# Patient Record
Sex: FEMALE | Race: WHITE | Employment: FULL TIME
[De-identification: names, ages, dates, MRNs, and addresses within clinical notes are randomized per-mention and may not be internally consistent; named-entity substitution may affect disease eponyms.]

---

## 2017-06-10 ENCOUNTER — HEALTH MAINTENANCE LETTER (OUTPATIENT)
Age: 52
End: 2017-06-10

## 2017-09-01 ENCOUNTER — TRANSFERRED RECORDS (OUTPATIENT)
Dept: HEALTH INFORMATION MANAGEMENT | Facility: CLINIC | Age: 52
End: 2017-09-01

## 2017-10-23 ENCOUNTER — TELEPHONE (OUTPATIENT)
Dept: FAMILY MEDICINE | Facility: CLINIC | Age: 52
End: 2017-10-23

## 2017-10-23 ENCOUNTER — OFFICE VISIT (OUTPATIENT)
Dept: FAMILY MEDICINE | Facility: CLINIC | Age: 52
End: 2017-10-23
Payer: COMMERCIAL

## 2017-10-23 VITALS
WEIGHT: 154.8 LBS | HEART RATE: 60 BPM | DIASTOLIC BLOOD PRESSURE: 78 MMHG | TEMPERATURE: 98.5 F | BODY MASS INDEX: 24.3 KG/M2 | HEIGHT: 67 IN | SYSTOLIC BLOOD PRESSURE: 124 MMHG

## 2017-10-23 DIAGNOSIS — Z12.11 SPECIAL SCREENING FOR MALIGNANT NEOPLASMS, COLON: ICD-10-CM

## 2017-10-23 DIAGNOSIS — F98.8 ATTENTION DEFICIT DISORDER OF ADULT: ICD-10-CM

## 2017-10-23 DIAGNOSIS — Z13.6 CARDIOVASCULAR SCREENING; LDL GOAL LESS THAN 160: ICD-10-CM

## 2017-10-23 DIAGNOSIS — E55.9 VITAMIN D DEFICIENCY: ICD-10-CM

## 2017-10-23 DIAGNOSIS — R11.0 NAUSEA: ICD-10-CM

## 2017-10-23 DIAGNOSIS — R73.09 ELEVATED GLUCOSE: ICD-10-CM

## 2017-10-23 DIAGNOSIS — Z11.59 NEED FOR HEPATITIS C SCREENING TEST: ICD-10-CM

## 2017-10-23 DIAGNOSIS — R03.0 ELEVATED BLOOD PRESSURE READING WITHOUT DIAGNOSIS OF HYPERTENSION: ICD-10-CM

## 2017-10-23 DIAGNOSIS — Z01.419 ENCOUNTER FOR GYNECOLOGICAL EXAMINATION WITHOUT ABNORMAL FINDING: Primary | ICD-10-CM

## 2017-10-23 DIAGNOSIS — Z12.31 ENCOUNTER FOR SCREENING MAMMOGRAM FOR MALIGNANT NEOPLASM OF BREAST: ICD-10-CM

## 2017-10-23 LAB
ANION GAP SERPL CALCULATED.3IONS-SCNC: 5 MMOL/L (ref 3–14)
BUN SERPL-MCNC: 14 MG/DL (ref 7–30)
CALCIUM SERPL-MCNC: 9.5 MG/DL (ref 8.5–10.1)
CHLORIDE SERPL-SCNC: 105 MMOL/L (ref 94–109)
CHOLEST SERPL-MCNC: 263 MG/DL
CO2 SERPL-SCNC: 27 MMOL/L (ref 20–32)
CREAT SERPL-MCNC: 0.75 MG/DL (ref 0.52–1.04)
GFR SERPL CREATININE-BSD FRML MDRD: 80 ML/MIN/1.7M2
GLUCOSE SERPL-MCNC: 95 MG/DL (ref 70–99)
HBA1C MFR BLD: 5.4 % (ref 4.3–6)
HDLC SERPL-MCNC: 58 MG/DL
LDLC SERPL CALC-MCNC: 142 MG/DL
NONHDLC SERPL-MCNC: 205 MG/DL
POTASSIUM SERPL-SCNC: 4.1 MMOL/L (ref 3.4–5.3)
SODIUM SERPL-SCNC: 137 MMOL/L (ref 133–144)
TRIGL SERPL-MCNC: 316 MG/DL

## 2017-10-23 PROCEDURE — 82306 VITAMIN D 25 HYDROXY: CPT | Performed by: NURSE PRACTITIONER

## 2017-10-23 PROCEDURE — 36415 COLL VENOUS BLD VENIPUNCTURE: CPT | Performed by: NURSE PRACTITIONER

## 2017-10-23 PROCEDURE — 99396 PREV VISIT EST AGE 40-64: CPT | Performed by: NURSE PRACTITIONER

## 2017-10-23 PROCEDURE — 86803 HEPATITIS C AB TEST: CPT | Performed by: NURSE PRACTITIONER

## 2017-10-23 PROCEDURE — 80048 BASIC METABOLIC PNL TOTAL CA: CPT | Performed by: NURSE PRACTITIONER

## 2017-10-23 PROCEDURE — 87624 HPV HI-RISK TYP POOLED RSLT: CPT | Performed by: NURSE PRACTITIONER

## 2017-10-23 PROCEDURE — 80061 LIPID PANEL: CPT | Performed by: NURSE PRACTITIONER

## 2017-10-23 PROCEDURE — G0145 SCR C/V CYTO,THINLAYER,RESCR: HCPCS | Performed by: NURSE PRACTITIONER

## 2017-10-23 PROCEDURE — 83036 HEMOGLOBIN GLYCOSYLATED A1C: CPT | Performed by: NURSE PRACTITIONER

## 2017-10-23 RX ORDER — DEXTROAMPHETAMINE SACCHARATE, AMPHETAMINE ASPARTATE, DEXTROAMPHETAMINE SULFATE AND AMPHETAMINE SULFATE 5; 5; 5; 5 MG/1; MG/1; MG/1; MG/1
TABLET ORAL
Qty: 60 TABLET | Refills: 0 | Status: SHIPPED | OUTPATIENT
Start: 2017-11-22 | End: 2018-06-25

## 2017-10-23 RX ORDER — DEXTROAMPHETAMINE SACCHARATE, AMPHETAMINE ASPARTATE, DEXTROAMPHETAMINE SULFATE AND AMPHETAMINE SULFATE 5; 5; 5; 5 MG/1; MG/1; MG/1; MG/1
TABLET ORAL
Qty: 60 TABLET | Refills: 0 | Status: SHIPPED | OUTPATIENT
Start: 2017-10-23 | End: 2020-01-15

## 2017-10-23 RX ORDER — DEXTROAMPHETAMINE SACCHARATE, AMPHETAMINE ASPARTATE, DEXTROAMPHETAMINE SULFATE AND AMPHETAMINE SULFATE 5; 5; 5; 5 MG/1; MG/1; MG/1; MG/1
TABLET ORAL
Qty: 60 TABLET | Refills: 0 | Status: SHIPPED | OUTPATIENT
Start: 2017-12-22 | End: 2018-06-25

## 2017-10-23 RX ORDER — HYDROCODONE BITARTRATE AND ACETAMINOPHEN 5; 325 MG/1; MG/1
1 TABLET ORAL EVERY 6 HOURS PRN
COMMUNITY
End: 2018-06-25

## 2017-10-23 RX ORDER — ONDANSETRON 4 MG/1
4 TABLET, FILM COATED ORAL EVERY 8 HOURS PRN
Qty: 18 TABLET | Refills: 3 | Status: SHIPPED | OUTPATIENT
Start: 2017-10-23 | End: 2021-03-08

## 2017-10-23 ASSESSMENT — ANXIETY QUESTIONNAIRES
GAD7 TOTAL SCORE: 1
7. FEELING AFRAID AS IF SOMETHING AWFUL MIGHT HAPPEN: NOT AT ALL
6. BECOMING EASILY ANNOYED OR IRRITABLE: NOT AT ALL
2. NOT BEING ABLE TO STOP OR CONTROL WORRYING: NOT AT ALL
5. BEING SO RESTLESS THAT IT IS HARD TO SIT STILL: SEVERAL DAYS
1. FEELING NERVOUS, ANXIOUS, OR ON EDGE: NOT AT ALL
3. WORRYING TOO MUCH ABOUT DIFFERENT THINGS: NOT AT ALL

## 2017-10-23 ASSESSMENT — PATIENT HEALTH QUESTIONNAIRE - PHQ9
5. POOR APPETITE OR OVEREATING: NOT AT ALL
SUM OF ALL RESPONSES TO PHQ QUESTIONS 1-9: 2

## 2017-10-23 NOTE — PROGRESS NOTES
SUBJECTIVE:   CC: Kathy Davidson is an 52 year old woman who presents for preventive health visit.     Healthy Habits:    Do you get at least three servings of calcium containing foods daily (dairy, green leafy vegetables, etc.)? yes    Amount of exercise or daily activities, outside of work: Has not been exercising much due to getting rid of dog and shoulder pain.    Problems taking medications regularly No    Medication side effects: No    Have you had an eye exam in the past two years? yes    Do you see a dentist twice per year? yes  Do you have sleep apnea, excessive snoring or daytime drowsiness?no    *Usually goes to [facility]. Needs refills on zofran and adderall.    *Is fasting.    *Does have a partially torn rotator cuff on the left shoulder, would like to discuss what to do about it.     *Hm - Declines flu shot. PHQ9 given to patient, completed see assessments. Colonoscopy completed 12/2014 and mammogram 9/2017 at [facility].                                                                                                       Some-                                                                        Never   Rarely      times       Often  Very Often  1. How often do you have trouble wrapping up the final details of a project,  once the challenging parts have been done?     x   2. How often do you have difficulty getting things in order when you have to do a task that requires organization?    x    3. How often do you have problems remembering appointments or obligations?    x    4. When you have a task that requires a lot of thought, how often do you avoid or delay getting started?    x    5. How often do you fidget or squirm with your hands or feet when you have to sit down for a long time?    x    6. How often do you feel overly active and compelled to do things, like you were driven by a motor?   x       Part A                                                        7. How often do  you make careless mistakes when you have to work on a boring or difficult project?     x   8. How often do you have difficulty keeping your attention when you are doing boring or repetitive work?     x   9. How often do you have difficulty concentrating on what people say to you, even when they are speaking to you directly?    x    10. How often do you misplace or have difficulty finding things at home or at work?     x   11. How often are you distracted by activity or noise around you?   x     12. How often do you leave your seat in meetings or other situations in which you are expected to remain seated?      x    13. How often do you feel restless or fidgety?      x    14. How often do you have difficulty unwinding and relaxing when you have time to yourself?   x     15. How often do you find yourself talking too much when you are in social situations?    x    16. When you're in a conversation, how often do you find yourself finishing the sentences of the people you are talking to, before they can finish them themselves?    x    17. How often do you have difficulty waiting your turn in situations when turn-taking is required?   x     18. How often do you interrupt others when they are busy?  x          Today's PHQ-2 Score:   PHQ-2 ( 1999 Pfizer) 2/29/2016 5/2/2014   Q1: Little interest or pleasure in doing things 0 0   Q2: Feeling down, depressed or hopeless 0 0   PHQ-2 Score 0 0     Abuse: Current or Past(Physical, Sexual or Emotional)- No  Do you feel safe in your environment - Yes  Social History   Substance Use Topics     Smoking status: Never Smoker     Smokeless tobacco: Never Used     Alcohol use Yes      Comment: weekends, never during the week     The patient does not drink >3 drinks per day nor >7 drinks per week.    Reviewed orders with patient.  Reviewed health maintenance and updated orders accordingly - Yes  Labs reviewed in [facility]    Patient over age 50, mutual decision to screen reflected in health  "maintenance.      Pertinent mammograms are reviewed under the imaging tab.  History of abnormal Pap smear: NO - age 30- 65 PAP every 3 years recommended    Reviewed and updated as needed this visit by clinical staff  Tobacco  Allergies  Meds  Med Hx  Surg Hx  Fam Hx  Soc Hx        Reviewed and updated as needed this visit by Provider  Tobacco  Allergies  Meds  Med Hx  Surg Hx  Fam Hx  Soc Hx           ROS:  C: NEGATIVE for fever, chills, change in weight  I: NEGATIVE for worrisome rashes, moles or lesions  E: NEGATIVE for vision changes or irritation, has contacts,  Current with eye exam   ENT: NEGATIVE for ear, mouth and throat problems, POSITIVE for sinus.  Deviation, have been bad this fall.  See ENT as needed   R: NEGATIVE for significant cough or SOB  B: NEGATIVE for masses, tenderness or discharge,  Does get mammogram in [place]   CV: NEGATIVE for chest pain, palpitations or peripheral edema, , hx of ablation has done well since then   GI: NEGATIVE for nausea, abdominal pain, heartburn, or change in bowel habits  : NEGATIVE for unusual urinary or vaginal symptoms. No vaginal bleeding.  MUSCULOSKELETAL:POSITIVE  for joint pain left rotator cuff , no other issues   N: NEGATIVE for weakness, dizziness or paresthesias  E: NEGATIVE for temperature intolerance, skin/hair changes  H: NEGATIVE for bleeding problems  PSYCHIATRIC: NEGATIVE for changes in mood or affect and POSITIVE foranxiety when her son plays BB.  He did have a full workup for headaches to find out he has POTS and marfan syndrome. She is doing well with the diagnosis.      OBJECTIVE:   /78 (BP Location: Left arm, Patient Position: Chair, Cuff Size: Adult Regular)  Pulse 60  Temp 98.5  F (36.9  C) (Tympanic)  Ht 5' 6.58\" (1.691 m)  Wt 154 lb 12.8 oz (70.2 kg)  BMI 24.56 kg/m2  EXAM:  GENERAL APPEARANCE: healthy, alert and no distress  EYES: Eyes grossly normal to inspection, PERRL and conjunctivae and sclerae normal  HENT: " ear canals and TM's normal, oropharynx clear, oral mucous membranes moist, nasal mucosa edematous  and sinuses: maxillary, frontal tenderness on bilateral, maxillary, frontal swelling on bilateral  NECK: no adenopathy, no asymmetry, masses, or scars and thyroid normal to palpation  RESP: lungs clear to auscultation - no rales, rhonchi or wheezes  BREAST: normal without masses, tenderness or nipple discharge and no palpable axillary masses or adenopathy  CV: regular rate and rhythm, normal S1 S2, no S3 or S4, no murmur, click or rub, no peripheral edema and peripheral pulses strong  ABDOMEN: soft, nontender, no hepatosplenomegaly, no masses and bowel sounds normal   (female): normal female external genitalia, normal urethral meatus, vaginal mucosal atrophy noted and normal cervix, adnexae, and uterus without masses.  MS: no musculoskeletal defects are noted, gait is age appropriate without ataxia, left shoulder does have limited ROM..  With abduction  90 degrees ,  Flexion  To  100 degrees   SKIN: no suspicious lesions or rashes  NEURO: Normal strength and tone, sensory exam grossly normal, mentation intact and speech normal  PSYCH: mentation appears normal and affect normal/bright    ASSESSMENT/PLAN:     ASSESSMENT/PLAN:      ICD-10-CM    1. Encounter for gynecological examination without abnormal finding Z01.419 Pap imaged thin layer screen with HPV - recommended age 30 - 65     HPV High Risk Types DNA Cervical   2. Special screening for malignant neoplasms, colon Z12.11 GASTROENTEROLOGY ADULT REF PROCEDURE ONLY   3. Encounter for screening mammogram for malignant neoplasm of breast Z12.31 MA Screening Digital Bilateral   4. CARDIOVASCULAR SCREENING; LDL GOAL LESS THAN 160 Z13.6 Lipid panel reflex to direct LDL   5. Elevated blood pressure reading without diagnosis of hypertension R03.0 Basic metabolic panel  (Ca, Cl, CO2, Creat, Gluc, K, Na, BUN)   6. Elevated glucose R73.09 Hemoglobin A1c   7. Need for  hepatitis C screening test Z11.59 Hepatitis C Screen Reflex to HCV RNA Quant and Genotype   8. Attention deficit disorder of adult F98.8 amphetamine-dextroamphetamine (ADDERALL) 20 MG per tablet     amphetamine-dextroamphetamine (ADDERALL) 20 MG per tablet     amphetamine-dextroamphetamine (ADDERALL) 20 MG per tablet   9. Nausea R11.0 ondansetron (ZOFRAN) 4 MG tablet       Patient Instructions     Preventive Health Recommendations  Female Ages 50 - 64    Yearly exam: See your health care provider every year in order to  o Review health changes.   o Discuss preventive care.    o Review your medicines if your doctor has prescribed any.      Get a Pap test every three years (unless you have an abnormal result and your provider advises testing more often).    If you get Pap tests with HPV test, you only need to test every 5 years, unless you have an abnormal result.     You do not need a Pap test if your uterus was removed (hysterectomy) and you have not had cancer.    You should be tested each year for STDs (sexually transmitted diseases) if you're at risk.     Have a mammogram every 1 to 2 years.    Have a colonoscopy at age 50, or have a yearly FIT test (stool test). These exams screen for colon cancer.      Have a cholesterol test every 5 years, or more often if advised.    Have a diabetes test (fasting glucose) every three years. If you are at risk for diabetes, you should have this test more often.     If you are at risk for osteoporosis (brittle bone disease), think about having a bone density scan (DEXA).    Shots: Get a flu shot each year. Get a tetanus shot every 10 years.    Nutrition:     Eat at least 5 servings of fruits and vegetables each day.    Eat whole-grain bread, whole-wheat pasta and brown rice instead of white grains and rice.    Talk to your provider about Calcium and Vitamin D.     Lifestyle    Exercise at least 150 minutes a week (30 minutes a day, 5 days a week). This will help you control  "your weight and prevent disease.    Limit alcohol to one drink per day.    No smoking.     Wear sunscreen to prevent skin cancer.     See your dentist every six months for an exam and cleaning.    See your eye doctor every 1 to 2 years.      Please call and have them send your mammo and MRI results.     Keep exercising the left shoulder , swimming ,    See physical therapy .     You are doing well with every thing that has been going on     For the sinuses ,   Warm facial soaks and massage,    Use the nasal irrigation   Consider seeing  ENT                 COUNSELING:   Reviewed preventive health counseling, as reflected in patient instructions       Regular exercise       Healthy diet/nutrition       Vision screening       Advance Care Planning         reports that she has never smoked. She has never used smokeless tobacco.    Estimated body mass index is 24.56 kg/(m^2) as calculated from the following:    Height as of this encounter: 5' 6.58\" (1.691 m).    Weight as of this encounter: 154 lb 12.8 oz (70.2 kg).         Counseling Resources:  ATP IV Guidelines  Pooled Cohorts Equation Calculator  Breast Cancer Risk Calculator  FRAX Risk Assessment  ICSI Preventive Guidelines  Dietary Guidelines for Americans, 2010  USDA's MyPlate  ASA Prophylaxis  Lung CA Screening    JONATHAN GARCIA NP, APRN CNP  [facility]  "

## 2017-10-23 NOTE — NURSING NOTE
"Chief Complaint   Patient presents with     Physical       Initial /78 (BP Location: Left arm, Patient Position: Chair, Cuff Size: Adult Regular)  Pulse 60  Temp 98.5  F (36.9  C) (Tympanic)  Ht 5' 6.58\" (1.691 m)  Wt 154 lb 12.8 oz (70.2 kg)  BMI 24.56 kg/m2 Estimated body mass index is 24.56 kg/(m^2) as calculated from the following:    Height as of this encounter: 5' 6.58\" (1.691 m).    Weight as of this encounter: 154 lb 12.8 oz (70.2 kg).  Medication Reconciliation: complete     Viviana Lind CMA (AAMA)      "

## 2017-10-23 NOTE — TELEPHONE ENCOUNTER
Please abstract the following data from this visit with this patient into the appropriate field in Epic:    Colonoscopy done on this date: 12/1/2014 (approximately), by this group: [facility], results were normal, repeat in 5 years per family history.   Mammogram done on this date: 9/1/17 (approximately), by this group: [facility], results were normal.    Viviana Lind CMA ([facility])

## 2017-10-23 NOTE — MR AVS SNAPSHOT
After Visit Summary   10/23/2017    Kathy Davidson    MRN: [id number]           Patient Information     Date Of Birth          1965        Visit Information        Provider Department      10/23/2017 10:00 AM Chiquita Hunter APRN [facility]        Today's Diagnoses     Encounter for gynecological examination without abnormal finding    -  1    Special screening for malignant neoplasms, colon        Encounter for screening mammogram for malignant neoplasm of breast        CARDIOVASCULAR SCREENING; LDL GOAL LESS THAN 160        Elevated blood pressure reading without diagnosis of hypertension        Elevated glucose        Need for hepatitis C screening test        Attention deficit disorder of adult        Nausea          Care Instructions      Preventive Health Recommendations  Female Ages 50 - 64    Yearly exam: See your health care provider every year in order to  o Review health changes.   o Discuss preventive care.    o Review your medicines if your doctor has prescribed any.      Get a Pap test every three years (unless you have an abnormal result and your provider advises testing more often).    If you get Pap tests with HPV test, you only need to test every 5 years, unless you have an abnormal result.     You do not need a Pap test if your uterus was removed (hysterectomy) and you have not had cancer.    You should be tested each year for STDs (sexually transmitted diseases) if you're at risk.     Have a mammogram every 1 to 2 years.    Have a colonoscopy at age 50, or have a yearly FIT test (stool test). These exams screen for colon cancer.      Have a cholesterol test every 5 years, or more often if advised.    Have a diabetes test (fasting glucose) every three years. If you are at risk for diabetes, you should have this test more often.     If you are at risk for osteoporosis (brittle bone disease), think about having a bone density scan (DEXA).    Shots: Get a  flu shot each year. Get a tetanus shot every 10 years.    Nutrition:     Eat at least 5 servings of fruits and vegetables each day.    Eat whole-grain bread, whole-wheat pasta and brown rice instead of white grains and rice.    Talk to your provider about Calcium and Vitamin D.     Lifestyle    Exercise at least 150 minutes a week (30 minutes a day, 5 days a week). This will help you control your weight and prevent disease.    Limit alcohol to one drink per day.    No smoking.     Wear sunscreen to prevent skin cancer.     See your dentist every six months for an exam and cleaning.    See your eye doctor every 1 to 2 years.      Please call and have them send your mammo and MRI results.     Keep exercising the left shoulder , swimming ,    See physical therapy .     You are doing well with every thing that has been going on     For the sinuses ,   Warm facial soaks and massage,    Use the nasal irrigation   Consider seeing  ENT               Follow-ups after your visit        Additional Services     GASTROENTEROLOGY ADULT REF PROCEDURE ONLY       Last Lab Result: Creatinine (mg/dL)       Date                     Value                 05/02/2014               0.94             ----------  There is no height or weight on file to calculate BMI.     Needed:  No  Language:  English    Patient will be contacted to schedule procedure.     Please be aware that coverage of these services is subject to the terms and limitations of your health insurance plan.  Call member services at your health plan with any benefit or coverage questions.  Any procedures must be performed at a [place] facility OR coordinated by your clinic's referral office.    Please bring the following with you to your appointment:    (1) Any X-Rays, CTs or MRIs which have been performed.  Contact the facility where they were done to arrange for  prior to your scheduled appointment.    (2) List of current medications   (3) This referral  "request   (4) Any documents/labs given to you for this referral                  Future tests that were ordered for you today     Open Future Orders        Priority Expected Expires Ordered    MA Screening Digital Bilateral Routine  10/24/2018 10/23/2017            Who to contact     Normal or non-critical lab and imaging results will be communicated to you by BetterPethart, letter or phone within 4 business days after the clinic has received the results. If you do not hear from us within 7 days, please contact the clinic through BetterPethart or phone. If you have a critical or abnormal lab result, we will notify you by phone as soon as possible.  Submit refill requests through Power Content or call your pharmacy and they will forward the refill request to us. Please allow 3 business days for your refill to be completed.          If you need to speak with a  for additional information , please call: [phone]           Additional Information About Your Visit        Power Content Information     Power Content gives you secure access to your electronic health record. If you see a primary care provider, you can also send messages to your care team and make appointments. If you have questions, please call your primary care clinic.  If you do not have a primary care provider, please call [phone] and they will assist you.        Care EveryWhere ID     This is your Care EveryWhere ID. This could be used by other organizations to access your [place] medical records  [id number]        Your Vitals Were     Pulse Temperature Height BMI (Body Mass Index)          60 98.5  F (36.9  C) (Tympanic) 5' 6.58\" (1.691 m) 24.56 kg/m2         Blood Pressure from Last 3 Encounters:   10/23/17 124/78   02/29/16 126/72   11/05/14 128/72    Weight from Last 3 Encounters:   10/23/17 154 lb 12.8 oz (70.2 kg)   02/29/16 155 lb 9.6 oz (70.6 kg)   11/05/14 140 lb 2 oz (63.6 kg)              We Performed the Following     Basic metabolic panel  " (Ca, Cl, CO2, Creat, Gluc, K, Na, BUN)     GASTROENTEROLOGY ADULT REF PROCEDURE ONLY     Hemoglobin A1c     Hepatitis C Screen Reflex to HCV RNA Quant and Genotype     HPV High Risk Types DNA Cervical     Lipid panel reflex to direct LDL     Pap imaged thin layer screen with HPV - recommended age 30 - 65          Today's Medication Changes          These changes are accurate as of: 10/23/17 10:52 AM.  If you have any questions, ask your nurse or doctor.               Start taking these medicines.        Dose/Directions    * amphetamine-dextroamphetamine 20 MG per tablet   Commonly known as:  ADDERALL   Used for:  Attention deficit disorder of adult   Started by:  Chiquita Hunter APRN CNP        Take 1 tablet by mouth 2 times daily for 30 days.   Quantity:  60 tablet   Refills:  0       * amphetamine-dextroamphetamine 20 MG per tablet   Commonly known as:  ADDERALL   Used for:  Attention deficit disorder of adult   Started by:  Chiquita Hunter APRN CNP        Start taking on:  11/22/2017   Take 1 tablet by mouth 2 times daily for 30 days.   Quantity:  60 tablet   Refills:  0       * amphetamine-dextroamphetamine 20 MG per tablet   Commonly known as:  ADDERALL   Used for:  Attention deficit disorder of adult   Started by:  Chiquita Hunter APRN CNP        Start taking on:  12/22/2017   Take 1 tablet by mouth 2 times daily for 30 days.   Quantity:  60 tablet   Refills:  0       * Notice:  This list has 3 medication(s) that are the same as other medications prescribed for you. Read the directions carefully, and ask your doctor or other care provider to review them with you.         Where to get your medicines      These medications were sent to [id number] - [place] - [address]  [address] [place] [address]     Phone:  [phone]     ondansetron 4 MG tablet         Some of these will need a paper prescription and others can be bought over the counter.  Ask your nurse if  you have questions.     Bring a paper prescription for each of these medications     amphetamine-dextroamphetamine 20 MG per tablet    amphetamine-dextroamphetamine 20 MG per tablet    amphetamine-dextroamphetamine 20 MG per tablet                Primary Care Provider Office Phone # Fax #    Chiquita SilvanaABIGAIL Jones [facility] [phone] [address] [place] [address]        Equal Access to Services     [facility] Center[facility] : Hadii aad ku hadasho Soomaali, waaxda luqadaha, qaybta kaalmada adeegyada, waxay idiin hayaan adeeg kharash la'aan ah. So [facility] [phone].    ATENCIÓN: Si habla español, tiene a tavarez disposición servicios gratuitos de asistencia lingüística. Llame al [phone].    We comply with applicable federal civil rights laws and [place] laws. We do not discriminate on the basis of race, color, national origin, age, disability, sex, sexual orientation, or gender identity.            Thank you!     [facility] you for [facility]  for your care. Our goal is always to provide you with excellent care. Hearing back from our patients is one way we can continue to improve our services. Please take a few minutes to complete the written survey that you may receive in the mail after your visit with us. Thank you!             Your Updated Medication List - Protect others around you: Learn how to safely use, store and throw away your medicines at www.disposemymeds.org.          This list is accurate as of: 10/23/17 10:52 AM.  Always use your most recent med list.                   Brand Name Dispense Instructions for use Diagnosis    * amphetamine-dextroamphetamine 20 MG per tablet    ADDERALL    60 tablet    Take 1 tablet by mouth 2 times daily for 30 days.    Attention deficit disorder of adult       * amphetamine-dextroamphetamine 20 MG per tablet   Start taking on:  11/22/2017    ADDERALL    60 tablet    Take 1 tablet by mouth 2 times daily for 30 days.    Attention deficit  disorder of adult       * amphetamine-dextroamphetamine 20 MG per tablet   Start taking on:  12/22/2017    ADDERALL    60 tablet    Take 1 tablet by mouth 2 times daily for 30 days.    Attention deficit disorder of adult       aspirin 81 MG tablet      Take  by mouth daily.        Azelaic Acid 15 % gel    FINACEA    50 g    Apply 0.5 inches topically as needed    Acne vulgaris       CYCLOBENZAPRINE HCL PO      Take 10 mg by mouth as needed for muscle spasms        DAILY MULTIVITAMIN PO      Take  by mouth daily.        fish oil-omega-3 fatty acids 1000 MG capsule      Take 2 g by mouth 2 times daily.        HYDROcodone-acetaminophen 5-325 MG per tablet    NORCO     Take 1 tablet by mouth every 6 hours as needed for moderate to severe pain        ondansetron 4 MG tablet    ZOFRAN    18 tablet    Take 1 tablet (4 mg) by mouth every 8 hours as needed for nausea or vomiting    Nausea       spironolactone 50 MG tablet    ALDACTONE    180 tablet    Take 1 tablet (50 mg) by mouth 2 times daily    Acne vulgaris       VITAMIN C PO      Take 1,000 mg by mouth daily        VITAMIN D (CHOLECALCIFEROL) PO      Take 1,000 Units by mouth daily        * Notice:  This list has 3 medication(s) that are the same as other medications prescribed for you. Read the directions carefully, and ask your doctor or other care provider to review them with you.

## 2017-10-23 NOTE — PATIENT INSTRUCTIONS
Preventive Health Recommendations  Female Ages 50 - 64    Yearly exam: See your health care provider every year in order to  o Review health changes.   o Discuss preventive care.    o Review your medicines if your doctor has prescribed any.      Get a Pap test every three years (unless you have an abnormal result and your provider advises testing more often).    If you get Pap tests with HPV test, you only need to test every 5 years, unless you have an abnormal result.     You do not need a Pap test if your uterus was removed (hysterectomy) and you have not had cancer.    You should be tested each year for STDs (sexually transmitted diseases) if you're at risk.     Have a mammogram every 1 to 2 years.    Have a colonoscopy at age 50, or have a yearly FIT test (stool test). These exams screen for colon cancer.      Have a cholesterol test every 5 years, or more often if advised.    Have a diabetes test (fasting glucose) every three years. If you are at risk for diabetes, you should have this test more often.     If you are at risk for osteoporosis (brittle bone disease), think about having a bone density scan (DEXA).    Shots: Get a flu shot each year. Get a tetanus shot every 10 years.    Nutrition:     Eat at least 5 servings of fruits and vegetables each day.    Eat whole-grain bread, whole-wheat pasta and brown rice instead of white grains and rice.    Talk to your provider about Calcium and Vitamin D.     Lifestyle    Exercise at least 150 minutes a week (30 minutes a day, 5 days a week). This will help you control your weight and prevent disease.    Limit alcohol to one drink per day.    No smoking.     Wear sunscreen to prevent skin cancer.     See your dentist every six months for an exam and cleaning.    See your eye doctor every 1 to 2 years.      Please call and have them send your mammo and MRI results.     Keep exercising the left shoulder , swimming ,    See physical therapy .     You are doing well  with every thing that has been going on     For the sinuses ,   Warm facial soaks and massage,    Use the nasal irrigation   Consider seeing  ENT

## 2017-10-24 LAB
DEPRECATED CALCIDIOL+CALCIFEROL SERPL-MC: 28 UG/L (ref 20–75)
HCV AB SERPL QL IA: NONREACTIVE

## 2017-10-24 ASSESSMENT — ANXIETY QUESTIONNAIRES: GAD7 TOTAL SCORE: 1

## 2017-10-25 LAB
COPATH REPORT: NORMAL
PAP: NORMAL

## 2017-10-26 LAB
FINAL DIAGNOSIS: NORMAL
HPV HR 12 DNA CVX QL NAA+PROBE: NEGATIVE
HPV16 DNA SPEC QL NAA+PROBE: NEGATIVE
HPV18 DNA SPEC QL NAA+PROBE: NEGATIVE
SPECIMEN DESCRIPTION: NORMAL

## 2018-06-25 ENCOUNTER — HOSPITAL ENCOUNTER (EMERGENCY)
Facility: CLINIC | Age: 53
Discharge: HOME | End: 2018-06-25
Attending: EMERGENCY MEDICINE | Admitting: EMERGENCY MEDICINE
Payer: COMMERCIAL

## 2018-06-25 ENCOUNTER — APPOINTMENT (OUTPATIENT)
Dept: GENERAL RADIOLOGY | Facility: CLINIC | Age: 53
End: 2018-06-25
Attending: EMERGENCY MEDICINE
Payer: COMMERCIAL

## 2018-06-25 VITALS
DIASTOLIC BLOOD PRESSURE: 92 MMHG | OXYGEN SATURATION: 100 % | WEIGHT: 150 LBS | HEART RATE: 104 BPM | SYSTOLIC BLOOD PRESSURE: 124 MMHG | RESPIRATION RATE: 18 BRPM | BODY MASS INDEX: 23.79 KG/M2

## 2018-06-25 DIAGNOSIS — S20.229A CONTUSION OF BACK, UNSPECIFIED LATERALITY, INITIAL ENCOUNTER: ICD-10-CM

## 2018-06-25 PROCEDURE — 25000128 H RX IP 250 OP 636: Performed by: EMERGENCY MEDICINE

## 2018-06-25 PROCEDURE — 72100 X-RAY EXAM L-S SPINE 2/3 VWS: CPT | Mod: TC

## 2018-06-25 PROCEDURE — 99285 EMERGENCY DEPT VISIT HI MDM: CPT | Performed by: EMERGENCY MEDICINE

## 2018-06-25 PROCEDURE — 96372 THER/PROPH/DIAG INJ SC/IM: CPT | Performed by: EMERGENCY MEDICINE

## 2018-06-25 PROCEDURE — 99285 EMERGENCY DEPT VISIT HI MDM: CPT | Mod: Z6 | Performed by: EMERGENCY MEDICINE

## 2018-06-25 RX ORDER — DIAZEPAM 5 MG
5-10 TABLET ORAL EVERY 6 HOURS PRN
Qty: 20 TABLET | Refills: 0 | Status: SHIPPED | OUTPATIENT
Start: 2018-06-25 | End: 2018-07-02

## 2018-06-25 RX ORDER — HYDROCODONE BITARTRATE AND ACETAMINOPHEN 5; 325 MG/1; MG/1
1-2 TABLET ORAL EVERY 4 HOURS PRN
Qty: 18 TABLET | Refills: 0 | Status: SHIPPED | OUTPATIENT
Start: 2018-06-25 | End: 2020-01-15

## 2018-06-25 RX ADMIN — HYDROMORPHONE HYDROCHLORIDE 1 MG: 1 INJECTION, SOLUTION INTRAMUSCULAR; INTRAVENOUS; SUBCUTANEOUS at 09:42

## 2018-06-25 NOTE — ED TRIAGE NOTES
Patient here with back pain after a fall out of the hot tub on Saturday. Patient has a bad back to begin with and she has been taking her medications at home but cannot walk on her own.

## 2018-06-25 NOTE — ED AVS SNAPSHOT
[facility] Emergency Department    [address] [place] [address]    Phone:  [phone]    Fax:  [phone]                                       Kathy Davidson   MRN: [id number]    Department:  [facility] Emergency Department   Date of Visit:  6/25/2018           Patient Information     Date Of Birth          1965        Your diagnoses for this visit were:     Contusion of back, unspecified laterality, initial encounter        You were seen by Ramírez Randle MD.      Follow-up Information     Follow up with Adamaris Read MD.    Specialty:  Family Medicine - Sports Medicine    Why:  As needed    Contact information:    [address]  [place] [id number] [phone]        Discharge References/Attachments     BACK CONTUSION (ENGLISH)      24 Hour Appointment Hotline       To make an appointment at any [facility] clinic, call [phone] ([phone]). If you don't have a family doctor or clinic, we will help you find one. [facility] clinics are conveniently located to serve the needs of you and your family.             Review of your medicines      START taking        Dose / Directions Last dose taken    diazepam 5 MG tablet   Commonly known as:  VALIUM   Dose:  5-10 mg   Quantity:  20 tablet        Take 1-2 tablets (5-10 mg) by mouth every 6 hours as needed for muscle spasms (MUSCLE SPASM)   Refills:  0          CONTINUE these medicines which may have CHANGED, or have new prescriptions. If we are uncertain of the size of tablets/capsules you have at home, strength may be listed as something that might have changed.        Dose / Directions Last dose taken    HYDROcodone-acetaminophen 5-325 MG per tablet   Commonly known as:  NORCO   Dose:  1-2 tablet   What changed:    - how much to take  - when to take this  - reasons to take this   Quantity:  18 tablet        Take 1-2 tablets by mouth every 4 hours as needed for pain   Refills:  0          Our records  show that you are taking the medicines listed below. If these are incorrect, please call your family doctor or clinic.        Dose / Directions Last dose taken    amphetamine-dextroamphetamine 20 MG per tablet   Commonly known as:  ADDERALL   Quantity:  60 tablet        Take 1 tablet by mouth 2 times daily for 30 days.   Refills:  0        aspirin 81 MG tablet        Take  by mouth daily.   Refills:  0        Azelaic Acid 15 % gel   Commonly known as:  FINACEA   Dose:  0.5 inch   Quantity:  50 g        Apply 0.5 inches topically as needed   Refills:  3        CYCLOBENZAPRINE HCL PO   Dose:  10 mg        Take 10 mg by mouth as needed for muscle spasms   Refills:  0        DAILY MULTIVITAMIN PO        Take  by mouth daily.   Refills:  0        fish oil-omega-3 fatty acids 1000 MG capsule   Dose:  2 g        Take 2 g by mouth 2 times daily.   Refills:  0        ondansetron 4 MG tablet   Commonly known as:  ZOFRAN   Dose:  4 mg   Quantity:  18 tablet        Take 1 tablet (4 mg) by mouth every 8 hours as needed for nausea or vomiting   Refills:  3        spironolactone 50 MG tablet   Commonly known as:  ALDACTONE   Dose:  50 mg   Quantity:  180 tablet        Take 1 tablet (50 mg) by mouth 2 times daily   Refills:  3        VITAMIN C PO   Dose:  1000 mg        Take 1,000 mg by mouth daily   Refills:  0        VITAMIN D (CHOLECALCIFEROL) PO   Dose:  1000 Units        Take 1,000 Units by mouth daily   Refills:  0                Information about OPIOIDS     PRESCRIPTION OPIOIDS: WHAT YOU NEED TO KNOW   We gave you an opioid (narcotic) pain medicine. It is important to manage your pain, but opioids are not always the best choice. You should first try all the other options your care team gave you. Take this medicine for as short a time (and as few doses) as possible.     These medicines have risks:    DO NOT drive when on new or higher doses of pain medicine. These medicines can affect your alertness and reaction times, and  you could be arrested for driving under the influence (DUI). If you need to use opioids long-term, talk to your care team about driving.    DO NOT operate heave machinery    DO NOT do any other dangerous activities while taking these medicines.     DO NOT drink any alcohol while taking these medicines.      If the opioid prescribed includes acetaminophen, DO NOT take with any other medicines that contain acetaminophen. Read all labels carefully. Look for the word  acetaminophen  or  Tylenol.  Ask your pharmacist if you have questions or are unsure.    You can get addicted to pain medicines, especially if you have a history of addiction (chemical, alcohol or substance dependence). Talk to your care team about ways to reduce this risk.    Store your pills in a secure place, locked if possible. We will not replace any lost or stolen medicine. If you don t finish your medicine, please throw away (dispose) as directed by your pharmacist. The [place] [employer]ution [employer] has more information about safe disposal: https://www.pca.formerly [facility].[place]/living-green/managing-unwanted-medications.     All opioids tend to cause constipation. Drink plenty of water and eat foods that have a lot of fiber, such as fruits, vegetables, prune juice, apple juice and high-fiber cereal. Take a laxative (Miralax, milk of magnesia, Colace, Senna) if you don t move your bowels at least every other day.         Prescriptions were sent or printed at these locations (2 Prescriptions)                   [facility] [place], [place] - [address]   [address] [place] [address] [place] [facility] [address]    Telephone:  [phone]   Fax:  [phone]   Hours:                  Printed at Department/Unit printer (2 of 2)         diazepam (VALIUM) 5 MG tablet               HYDROcodone-acetaminophen (NORCO) 5-325 MG per tablet                Procedures and tests performed during your visit     Lumbar spine XR, 2-3 views      Orders Needing Specimen  Collection     None      Pending Results     Date and Time Order Name Status Description    6/25/2018 0939 Lumbar spine XR, 2-3 views Preliminary             Pending Culture Results     No orders found from 6/23/2018 to 6/26/2018.            Pending Results Instructions     If you had any lab results that were not finalized at the time of your Discharge, you can call the ED Lab Result RN at [phone]. You will be contacted by this team for any positive Lab results or changes in treatment. The nurses are available 7 days a week from 10A to 6:30P.  You can leave a message 24 hours per day and they will return your call.        Thank you for choosing [facility]       Thank you for choosing [facility] for your care. Our goal is always to provide you with excellent care. Hearing back from our patients is one way we can continue to improve our services. Please take a few minutes to complete the written survey that you may receive in the mail after you visit with us. Thank you!        [employer] Information     [employer] gives you secure access to your electronic health record. If you see a primary care provider, you can also send messages to your care team and make appointments. If you have questions, please call your primary care clinic.  If you do not have a primary care provider, please call [phone] and they will assist you.        Care EveryWhere ID     This is your Care EveryWhere ID. This could be used by other organizations to access your [facility] medical records  [id number]        Equal Access to Services     GURPREET GASTELUM AH: Hadii selin Quintana, waclement restrepo, qaybiglesia lewisalmayra matamoros. So [facility] [phone].    ATENCIÓN: Si habla español, tiene a tavarez disposición servicios gratuitos de asistencia lingüística. Llame al [phone].    We comply with applicable federal civil rights laws and [place] laws. We do not discriminate on the basis of race, color,  national origin, age, disability, sex, sexual orientation, or gender identity.            After Visit Summary       This is your record. Keep this with you and show to your community pharmacist(s) and doctor(s) at your next visit.

## 2018-06-25 NOTE — ED AVS SNAPSHOT
[facility] Emergency Department    [address] [place] [address]    Phone:  [phone]    Fax:  [phone]                                       Kathy Davidson   MRN: [id number]    Department:  [facility] Emergency Department   Date of Visit:  6/25/2018           After Visit Summary Signature Page     I have received my discharge instructions, and my questions have been answered. I have discussed any challenges I see with this plan with the nurse or doctor.    ..........................................................................................................................................  Patient/Patient Representative Signature      ..........................................................................................................................................  Patient Representative Print Name and Relationship to Patient    ..................................................               ................................................  Date                                            Time    ..........................................................................................................................................  Reviewed by Signature/Title    ...................................................              ..............................................  Date                                                            Time

## 2018-06-25 NOTE — ED PROVIDER NOTES
"  History     Chief Complaint   Patient presents with     Back Pain     HPI  Kathy Davidson is a 53 year old female who presents with severe back pain.  She reports falling 2 days ago when they were out of town getting out of a Jacuzzi hitting her back.  She is reporting severe mid and left lower back pain since this time.  She has a history of a \"bad back\" she reports sciatica.  She has Vicodin available for this but never takes more than 50/year normally for this.  He also was on medical cannabis for her back pain.  She is tried 7 Vicodin over the last 2 days and Flexeril as well as 800 mg ibuprofen without relief.  Pain is made worse with movement.  She has had no loss of bowel or bladder.  Pain is sharp and severe.    Problem List:    Patient Active Problem List    Diagnosis Date Noted     Nausea 02/29/2016     Priority: Medium     Elevated glucose 02/29/2016     Priority: Medium     Vitamin D deficiency 05/02/2014     Priority: Medium     Problem list name updated by automated process. Provider to review       ASCUS favor benign 05/02/2014     Priority: Medium     5/2/14: Pap - ASCUS, Neg HPV. Plan cotest in 3 years.        Generalized anxiety disorder 01/24/2014     Priority: Medium     Diagnosis updated by automated process. Provider to review and confirm.       Family history of colon cancer-mother 09/13/2013     Priority: Medium     Rosacea 02/21/2013     Priority: Medium     Elevated blood pressure reading without diagnosis of hypertension 02/21/2013     Priority: Medium     Attention deficit disorder of adult 09/18/2012     Priority: Medium     Family history of coronary artery disease 09/18/2012     Priority: Medium     Chronic right SI joint pain 09/18/2012     Priority: Medium     DDD (degenerative disc disease), lumbar 09/18/2012     Priority: Medium     24 hour contact handout given 09/18/2012     Priority: Medium     EMERGENCY CARE PLAN  Presenting Problem Signs and Symptoms Treatment Plan    " Questions or conerns during clinic hours    I will call the clinic directly     Questions or conerns outside clinic hours    I will call the 24 hour nurse line at [phone]    Patient needs to schedule an appointment    I will call the 24 hour scheduling team at [phone] or clinic directly    Same day treatment     I will call the clinic first, nurse line if after hours, urgent care and express care if needed                                 CARDIOVASCULAR SCREENING; LDL GOAL LESS THAN 160 09/17/2012     Priority: Medium        Past Medical History:    Past Medical History:   Diagnosis Date     ASCUS favor benign 5/2014     Attention deficit disorder of adult 9/18/2012     CARDIOVASCULAR SCREENING; LDL GOAL LESS THAN 160 9/17/2012     Chronic right SI joint pain 9/18/2012     DDD (degenerative disc disease), lumbar 9/18/2012     Family history of coronary artery disease 9/18/2012       Past Surgical History:    Past Surgical History:   Procedure Laterality Date     H ABLATION SVT  2000     LUMPECTOMY BREAST      right breast.  non-cancerous      TUBAL LIGATION  2000       Family History:    Family History   Problem Relation Age of Onset     Cancer - colorectal Mother      Depression Mother      Psychotic Disorder Mother      Thyroid Disease Father      Lipids Father      HEART DISEASE Father      Cardiovascular Father      Alcohol/Drug Father      Hypertension Father      Cerebrovascular Disease Maternal Grandfather      Diabetes Paternal Grandmother      Cerebrovascular Disease Paternal Grandfather      Alcohol/Drug Paternal Grandfather      Alcohol/Drug Brother      Gynecology Sister      Cardiovascular Daughter      Congenital Anomalies Daughter      Cancer - colorectal Brother      Psychotic Disorder Son      ADHD     Psychotic Disorder Son      Autism     Cancer Brother        Social History:  Marital Status:   [2]  Social History   Substance Use Topics     Smoking status: Never Smoker      Smokeless tobacco: Never Used     Alcohol use Yes      Comment: weekends, never during the week        Medications:      CYCLOBENZAPRINE HCL PO   diazepam (VALIUM) 5 MG tablet   HYDROcodone-acetaminophen (NORCO) 5-325 MG per tablet   amphetamine-dextroamphetamine (ADDERALL) 20 MG per tablet   Ascorbic Acid (VITAMIN C PO)   aspirin 81 MG tablet   Azelaic Acid (FINACEA) 15 % gel   fish oil-omega-3 fatty acids (FISH OIL) 1000 MG capsule   Multiple Vitamin (DAILY MULTIVITAMIN PO)   ondansetron (ZOFRAN) 4 MG tablet   spironolactone (ALDACTONE) 50 MG tablet   VITAMIN D, CHOLECALCIFEROL, PO         Review of Systems  All other systems are reviewed and are negative    Physical Exam   BP: 124/79  Pulse: 104  Resp: 18  Weight: 68 kg (150 lb)  SpO2: 100 %      Physical Exam   Constitutional: She appears well-developed and well-nourished. She appears distressed.   HENT:   Head: Normocephalic and atraumatic.   Mouth/Throat: Oropharynx is clear and moist.   Eyes: Pupils are equal, round, and reactive to light. No scleral icterus.   Neck: Normal range of motion. Neck supple.   Cardiovascular: Normal rate, regular rhythm, normal heart sounds and intact distal pulses.    No murmur heard.  Pulmonary/Chest: No stridor. No respiratory distress. She has no wheezes. She has no rales.   Abdominal: Soft. There is no tenderness.   Musculoskeletal: She exhibits no edema or tenderness.   Strength intact in lower extremities.  Back is inspected.  There is no deformity, ecchymosis or areas of significant swelling.  There is significant diffuse tenderness throughout the lumbar spine.   Neurological: She is alert.   Skin: Skin is warm and dry. No rash noted. She is not diaphoretic. No erythema. No pallor.   Psychiatric: She has a normal mood and affect.   Nursing note and vitals reviewed.      ED Course     ED Course     Procedures               Critical Care time:  none               Results for orders placed or performed during the hospital  encounter of 06/25/18 (from the past 24 hour(s))   Lumbar spine XR, 2-3 views    Narrative    LUMBAR SPINE TWO TO THREE VIEWS   6/25/2018 10:05 AM     HISTORY: Trauma.     COMPARISON: None.    FINDINGS:  There are five non-rib bearing lumbar type vertebrae. There  is L5-S1 disc height loss with adjacent endplate eburnation.  Otherwise, the vertebral bodies, pedicles, disc spaces, perivertebral  soft tissues, alignment, and sacroiliac joints are normal in  appearance.  No evidence for fracture.       Impression    IMPRESSION:  L5-S1 degenerative disc disease. No fracture or  malalignment. Further evaluation with MRI may be helpful, as  clinically indicated.       Medications   HYDROmorphone (DILAUDID) injection 1 mg (1 mg Intramuscular Given 6/25/18 0942)       Assessments & Plan (with Medical Decision Making)  53-year-old female with a fall 2 days ago striking her lower back with pain.  X-ray without evidence for acute fracture.  After single IM dose of Dilaudid, pain is somewhat improved.  Discharged home in improved condition.  Referred to sports medicine as needed for follow-up.  Prescriptions below as needed.     I have reviewed the nursing notes.    I have reviewed the findings, diagnosis, plan and need for follow up with the patient.       New Prescriptions    DIAZEPAM (VALIUM) 5 MG TABLET    Take 1-2 tablets (5-10 mg) by mouth every 6 hours as needed for muscle spasms (MUSCLE SPASM)    HYDROCODONE-ACETAMINOPHEN (NORCO) 5-325 MG PER TABLET    Take 1-2 tablets by mouth every 4 hours as needed for pain       Final diagnoses:   Contusion of back, unspecified laterality, initial encounter       6/25/2018   [facility] EMERGENCY DEPARTMENT     Ramírez Randle MD  06/25/18 1038

## 2018-11-10 ENCOUNTER — HEALTH MAINTENANCE LETTER (OUTPATIENT)
Age: 53
End: 2018-11-10

## 2018-11-27 ENCOUNTER — TRANSFERRED RECORDS (OUTPATIENT)
Dept: HEALTH INFORMATION MANAGEMENT | Facility: CLINIC | Age: 53
End: 2018-11-27

## 2019-01-11 ENCOUNTER — TRANSFERRED RECORDS (OUTPATIENT)
Dept: MULTI SPECIALTY CLINIC | Facility: CLINIC | Age: 54
End: 2019-01-11

## 2019-01-11 LAB
CHOLEST SERPL-MCNC: 268 MG/DL (ref 100–199)
GLUCOSE SERPL-MCNC: 74 MG/DL (ref 65–100)
HDLC SERPL-MCNC: 58 MG/DL
HEP C HIM: NORMAL
LDLC SERPL CALC-MCNC: 170 MG/DL
NONHDLC SERPL-MCNC: 210 MG/DL
TRIGL SERPL-MCNC: 200 MG/DL
TSH SERPL-ACNC: 1.14 UIU/ML (ref 0.35–4.94)

## 2019-09-30 ENCOUNTER — HEALTH MAINTENANCE LETTER (OUTPATIENT)
Age: 54
End: 2019-09-30

## 2020-01-14 NOTE — PROGRESS NOTES
Subjective     Kathy Davidson is a 54 year old female who presents to clinic today for the following health issues:    HPI     She uses Zofran as needed for nausea from chronic sinusitis and postnasal drainage that makes her nauseous. She uses it a couple times a week. She was told she needs sinus surgery as her sinuses never drain and she has a deviated septum. She has 5 children, some with special needs who she has been attending to and putting her needs second. She is thinking she will get the sinus surgery done in the near future.   She takes cyclobenzaprine for occasional flare of sciatic nerve. She has a medical CBD card and since using CBD she has only needed 60 cyclobenzaprine per year. She uses 30 tablets of Norco has lasted her over one year. She only uses this when the pain is severe and the other medications do not help the pain.     She has a fear of flying since a bad flight long ago when she was pregnant with their 2nd child and away from their first child for the first time. She goes on a plan approximately 3-4 times per year and has used diazepam prior to flying which is helpful.    She was diagnosed with ADHD and uses Adderall only when she is doing projects. 60 tablets lasts her one year.     She is due for colonoscopy in 2020 and will go to [place] to see Dr. Neff who has done them in the past. Family history of colon cancer.    She was diagnosed with PTSD after the loss of her son during open heart surgery. She will dissociate at times as a coping mechanism. She will find herself across the store and forget how she got there. She will do this also if she sees someone treating someone with autism poorly as her youngest son has autism so she is sensitive to this.     Mamm[facility] done at [facility] in 1/16/2019.   Colonoscopy last 3/5/15    Answers for HPI/ROS submitted by the patient on 1/15/2020   If you checked off any problems, how difficult have these problems made it for you to do your work, take  care of things at home, or get along with other people?: Somewhat difficult  PHQ9 TOTAL SCORE: 3  CASSIE 7 TOTAL SCORE: 4      Patient Active Problem List   Diagnosis     CARDIOVASCULAR SCREENING; LDL GOAL LESS THAN 160     Attention deficit disorder of adult     Family history of coronary artery disease     Chronic right SI joint pain     DDD (degenerative disc disease), lumbar     24 hour contact handout given     Rosacea     Elevated blood pressure reading without diagnosis of hypertension     Family history of colon cancer-mother     Generalized anxiety disorder     Vitamin D deficiency     ASCUS favor benign     Nausea     Elevated glucose     Past Surgical History:   Procedure Laterality Date     H ABLATION SVT  2000     LUMPECTOMY BREAST      right breast.  non-cancerous      TUBAL LIGATION  2000       Social History     Tobacco Use     Smoking status: Never Smoker     Smokeless tobacco: Never Used   Substance Use Topics     Alcohol use: Yes     Comment: weekends, never during the week     Family History   Problem Relation Age of Onset     Cancer - colorectal Mother      Depression Mother      Psychotic Disorder Mother      Thyroid Disease Father      Lipids Father      Heart Disease Father      Cardiovascular Father      Alcohol/Drug Father      Hypertension Father      Cerebrovascular Disease Maternal Grandfather      Diabetes Paternal Grandmother      Cerebrovascular Disease Paternal Grandfather      Alcohol/Drug Paternal Grandfather      Alcohol/Drug Brother      Gynecology Sister      Cardiovascular Daughter      Congenital Anomalies Daughter      Cancer - colorectal Brother      Psychotic Disorder Son         ADHD     Psychotic Disorder Son         Autism     Cancer Brother         bladder cancer     Colon Cancer Brother      Alzheimer Disease Brother      Esophageal Cancer Brother          Current Outpatient Medications   Medication Sig Dispense Refill     amphetamine-dextroamphetamine (ADDERALL) 20 MG  "tablet Take 1 tablet by mouth 2 times daily as needed 60 tablet 0     Cyanocobalamin (VITAMIN B-12 PO)        CYCLOBENZAPRINE HCL PO Take 10 mg by mouth as needed for muscle spasms       diazepam (VALIUM) 5 MG tablet Take 1 tablet (5 mg) by mouth as needed (for anxiety related to flying) 10 tablet 0     fish oil-omega-3 fatty acids (FISH OIL) 1000 MG capsule Take 2 g by mouth 2 times daily.       HYDROcodone-acetaminophen (NORCO) 5-325 MG tablet Take 1-2 tablets by mouth every 4 hours as needed for pain 18 tablet 0     medical cannabis (Patient's own supply) See Admin Instructions (The purpose of this order is to document that the patient reports taking medical cannabis.  This is not a prescription, and is not used to certify that the patient has a qualifying medical condition.)       ondansetron (ZOFRAN) 4 MG tablet Take 1 tablet (4 mg) by mouth every 8 hours as needed for nausea or vomiting 18 tablet 3     ondansetron (ZOFRAN-ODT) 4 MG ODT tab Take 1 tablet (4 mg) by mouth every 8 hours as needed for nausea 30 tablet 3     pseudoePHEDrine (SUDAFED) 120 MG 12 hr tablet Take 1 tablet (120 mg) by mouth daily 90 tablet 3     tretinoin (RETIN-A) 0.05 % external cream Apply topically At Bedtime 45 g 3     VITAMIN D, CHOLECALCIFEROL, PO Take 1,000 Units by mouth daily       Allergies   Allergen Reactions     Atorvastatin      Muscle aches     Recent Labs   Lab Test 10/23/17  1101 05/02/14  1031   A1C 5.4  --    * 186*   HDL 58 68   TRIG 316* 142   CR 0.75 0.94   GFRESTIMATED 80 63   GFRESTBLACK >90 76   POTASSIUM 4.1 4.6        Reviewed and updated as needed this visit by Provider         Review of Systems   ROS COMP: Constitutional, HEENT, cardiovascular, pulmonary, GI, , musculoskeletal, neuro, skin, endocrine and psych systems are negative, except as otherwise noted.      Objective    /86   Pulse 79   Temp 98.1  F (36.7  C) (Temporal)   Resp 16   Ht 1.691 m (5' 6.58\")   Wt 64.8 kg (142 lb 14.4 oz)  "  SpO2 98%   BMI 22.67 kg/m    Body mass index is 22.67 kg/m .  Physical Exam   GENERAL: healthy, alert and no distress  EYES: Eyes grossly normal to inspection, PERRL and conjunctivae and sclerae normal  NECK: no adenopathy, no asymmetry, masses, or scars and thyroid normal to palpation  RESP: lungs clear to auscultation - no rales, rhonchi or wheezes  CV: regular rate and rhythm, normal S1 S2, no S3 or S4, no murmur, click or rub  MS: no gross musculoskeletal defects noted, no edema  SKIN: no suspicious lesions or rashes  NEURO: Normal strength and tone, mentation intact and speech normal  PSYCH: mentation appears normal, affect normal/bright    Diagnostic Test Results:  Labs reviewed in Epic        Assessment & Plan     1. Chronic maxillary sinusitis  Stable. Needs maxillary sinus and deviated septum surgery. Continue current medications for now.   - ondansetron (ZOFRAN-ODT) 4 MG ODT tab; Take 1 tablet (4 mg) by mouth every 8 hours as needed for nausea  Dispense: 30 tablet; Refill: 3  - pseudoePHEDrine (SUDAFED) 120 MG 12 hr tablet; Take 1 tablet (120 mg) by mouth daily  Dispense: 90 tablet; Refill: 3    2. Flying phobia  Thinks 10 tabs should last one year as she flies approximately 3-4 times per year.  - diazepam (VALIUM) 5 MG tablet; Take 1 tablet (5 mg) by mouth as needed (for anxiety related to flying)  Dispense: 10 tablet; Refill: 0    3. Attention deficit disorder of adult  Stable. Uses only when doing projects.  - amphetamine-dextroamphetamine (ADDERALL) 20 MG tablet; Take 1 tablet by mouth 2 times daily as needed  Dispense: 60 tablet; Refill: 0    4. Rosacea  Finacea not helping as much anymore. Would like to switch back to retin-A  - tretinoin (RETIN-A) 0.05 % external cream; Apply topically At Bedtime  Dispense: 45 g; Refill: 3    5. Bilateral low back pain without sciatica, unspecified chronicity  Stable. Gets CBD with medical marijuana card through [employer] labs.  - HYDROcodone-acetaminophen (NORCO)  5-325 MG tablet; Take 1-2 tablets by mouth every 4 hours as needed for pain  Dispense: 18 tablet; Refill: 0    6. Nausea  Uses intermittently for nausea related to postnasal drainage from chronic sinusitis.   - ondansetron (ZOFRAN-ODT) 4 MG ODT tab; Take 1 tablet (4 mg) by mouth every 8 hours as needed for nausea  Dispense: 30 tablet; Refill: 3    7. Special screening for malignant neoplasms, colon  Will have done by Dr. Neff in [place]  - GASTROENTEROLOGY ADULT REF PROCEDURE ONLY Other ([facility] with Dr. Neff)    8. Encounter for screening mammogram for breast cancer  - MA SCREENING DIGITAL BILAT - Future  (s+30); Future      Return in about 3 months (around 4/15/2020) for Routine Visit and Pap.    ABIGAIL Null [facility]

## 2020-01-15 ENCOUNTER — OFFICE VISIT (OUTPATIENT)
Dept: FAMILY MEDICINE | Facility: OTHER | Age: 55
End: 2020-01-15
Payer: COMMERCIAL

## 2020-01-15 VITALS
BODY MASS INDEX: 22.43 KG/M2 | SYSTOLIC BLOOD PRESSURE: 118 MMHG | OXYGEN SATURATION: 98 % | RESPIRATION RATE: 16 BRPM | HEIGHT: 67 IN | TEMPERATURE: 98.1 F | WEIGHT: 142.9 LBS | DIASTOLIC BLOOD PRESSURE: 86 MMHG | HEART RATE: 79 BPM

## 2020-01-15 DIAGNOSIS — J32.0 CHRONIC MAXILLARY SINUSITIS: Primary | ICD-10-CM

## 2020-01-15 DIAGNOSIS — M54.50 BILATERAL LOW BACK PAIN WITHOUT SCIATICA, UNSPECIFIED CHRONICITY: ICD-10-CM

## 2020-01-15 DIAGNOSIS — F40.243 FLYING PHOBIA: ICD-10-CM

## 2020-01-15 DIAGNOSIS — L71.9 ROSACEA: ICD-10-CM

## 2020-01-15 DIAGNOSIS — Z12.11 SPECIAL SCREENING FOR MALIGNANT NEOPLASMS, COLON: ICD-10-CM

## 2020-01-15 DIAGNOSIS — Z12.31 ENCOUNTER FOR SCREENING MAMMOGRAM FOR BREAST CANCER: ICD-10-CM

## 2020-01-15 DIAGNOSIS — R11.0 NAUSEA: ICD-10-CM

## 2020-01-15 DIAGNOSIS — F98.8 ATTENTION DEFICIT DISORDER OF ADULT: ICD-10-CM

## 2020-01-15 PROCEDURE — 99204 OFFICE O/P NEW MOD 45 MIN: CPT | Performed by: STUDENT IN AN ORGANIZED HEALTH CARE EDUCATION/TRAINING PROGRAM

## 2020-01-15 RX ORDER — ONDANSETRON 4 MG/1
4 TABLET, ORALLY DISINTEGRATING ORAL EVERY 8 HOURS PRN
Qty: 30 TABLET | Refills: 3 | Status: SHIPPED | OUTPATIENT
Start: 2020-01-15 | End: 2021-03-08

## 2020-01-15 RX ORDER — PSEUDOEPHEDRINE HCL 120 MG/1
120 TABLET, FILM COATED, EXTENDED RELEASE ORAL DAILY
Qty: 90 TABLET | Refills: 3 | Status: SHIPPED | OUTPATIENT
Start: 2020-01-15 | End: 2021-03-08

## 2020-01-15 RX ORDER — DEXTROAMPHETAMINE SACCHARATE, AMPHETAMINE ASPARTATE, DEXTROAMPHETAMINE SULFATE AND AMPHETAMINE SULFATE 5; 5; 5; 5 MG/1; MG/1; MG/1; MG/1
TABLET ORAL
Qty: 60 TABLET | Refills: 0 | Status: SHIPPED | OUTPATIENT
Start: 2020-01-15

## 2020-01-15 RX ORDER — DIAZEPAM 5 MG
TABLET ORAL
COMMUNITY
Start: 2018-11-28 | End: 2020-01-15

## 2020-01-15 RX ORDER — DIAZEPAM 5 MG
5 TABLET ORAL PRN
Qty: 10 TABLET | Refills: 0 | Status: SHIPPED | OUTPATIENT
Start: 2020-01-15 | End: 2020-07-10

## 2020-01-15 RX ORDER — TRETINOIN 0.5 MG/G
CREAM TOPICAL AT BEDTIME
Qty: 45 G | Refills: 3 | Status: SHIPPED | OUTPATIENT
Start: 2020-01-15 | End: 2021-03-08

## 2020-01-15 RX ORDER — HYDROCODONE BITARTRATE AND ACETAMINOPHEN 5; 325 MG/1; MG/1
1-2 TABLET ORAL EVERY 4 HOURS PRN
Qty: 18 TABLET | Refills: 0 | Status: SHIPPED | OUTPATIENT
Start: 2020-01-15 | End: 2020-07-10

## 2020-01-15 ASSESSMENT — ANXIETY QUESTIONNAIRES
GAD7 TOTAL SCORE: 4
2. NOT BEING ABLE TO STOP OR CONTROL WORRYING: NOT AT ALL
GAD7 TOTAL SCORE: 4
4. TROUBLE RELAXING: SEVERAL DAYS
7. FEELING AFRAID AS IF SOMETHING AWFUL MIGHT HAPPEN: NOT AT ALL
3. WORRYING TOO MUCH ABOUT DIFFERENT THINGS: NOT AT ALL
5. BEING SO RESTLESS THAT IT IS HARD TO SIT STILL: MORE THAN HALF THE DAYS
7. FEELING AFRAID AS IF SOMETHING AWFUL MIGHT HAPPEN: NOT AT ALL
6. BECOMING EASILY ANNOYED OR IRRITABLE: NOT AT ALL
1. FEELING NERVOUS, ANXIOUS, OR ON EDGE: SEVERAL DAYS
GAD7 TOTAL SCORE: 4

## 2020-01-15 ASSESSMENT — PATIENT HEALTH QUESTIONNAIRE - PHQ9
SUM OF ALL RESPONSES TO PHQ QUESTIONS 1-9: 3
SUM OF ALL RESPONSES TO PHQ QUESTIONS 1-9: 3
10. IF YOU CHECKED OFF ANY PROBLEMS, HOW DIFFICULT HAVE THESE PROBLEMS MADE IT FOR YOU TO DO YOUR WORK, TAKE CARE OF THINGS AT HOME, OR GET ALONG WITH OTHER PEOPLE: SOMEWHAT DIFFICULT

## 2020-01-15 ASSESSMENT — MIFFLIN-ST. JEOR: SCORE: 1264.07

## 2020-01-15 NOTE — Clinical Note
Please abstract the following data from this visit with this patient into the appropriate field in Epic:Tests that can be patient reported without a hard copy:Colonoscopy done on this date: 3/5/15 (approximately), by this group: Katie, results were normal.  and Mammogram done on this date: 1/16/19 (approximately), by this group: Katie, results were normal.

## 2020-01-16 ASSESSMENT — ANXIETY QUESTIONNAIRES: GAD7 TOTAL SCORE: 4

## 2020-01-16 ASSESSMENT — PATIENT HEALTH QUESTIONNAIRE - PHQ9: SUM OF ALL RESPONSES TO PHQ QUESTIONS 1-9: 3

## 2020-01-22 DIAGNOSIS — M54.30 SCIATICA, UNSPECIFIED LATERALITY: Primary | ICD-10-CM

## 2020-01-22 RX ORDER — CYCLOBENZAPRINE HCL 10 MG
10 TABLET ORAL PRN
OUTPATIENT
Start: 2020-01-22

## 2020-01-22 NOTE — TELEPHONE ENCOUNTER
Pending Prescriptions:                       Disp   Refills    cyclobenzaprine (FLEXERIL) 10 MG tablet                    Sig: Take 1 tablet (10 mg) by mouth as needed for muscle           spasms      Routing refill request to provider for review/approval because:  Medication is reported/historical    Yanet Montoya, MSN, RN

## 2020-01-23 RX ORDER — CYCLOBENZAPRINE HCL 10 MG
10 TABLET ORAL 3 TIMES DAILY PRN
Qty: 60 TABLET | Refills: 1 | Status: SHIPPED | OUTPATIENT
Start: 2020-01-23 | End: 2021-03-08

## 2020-03-02 ENCOUNTER — NURSE TRIAGE (OUTPATIENT)
Dept: FAMILY MEDICINE | Facility: CLINIC | Age: 55
End: 2020-03-02

## 2020-03-02 ENCOUNTER — APPOINTMENT (OUTPATIENT)
Dept: CT IMAGING | Facility: CLINIC | Age: 55
End: 2020-03-02
Attending: PHYSICIAN ASSISTANT
Payer: COMMERCIAL

## 2020-03-02 ENCOUNTER — HOSPITAL ENCOUNTER (EMERGENCY)
Facility: CLINIC | Age: 55
Discharge: HOME | End: 2020-03-02
Attending: PHYSICIAN ASSISTANT | Admitting: PHYSICIAN ASSISTANT
Payer: COMMERCIAL

## 2020-03-02 VITALS
RESPIRATION RATE: 18 BRPM | SYSTOLIC BLOOD PRESSURE: 131 MMHG | HEIGHT: 66 IN | TEMPERATURE: 98.6 F | BODY MASS INDEX: 23.3 KG/M2 | OXYGEN SATURATION: 99 % | DIASTOLIC BLOOD PRESSURE: 75 MMHG | HEART RATE: 90 BPM | WEIGHT: 145 LBS

## 2020-03-02 DIAGNOSIS — K57.32 DIVERTICULITIS LARGE INTESTINE W/O PERFORATION OR ABSCESS W/O BLEEDING: ICD-10-CM

## 2020-03-02 LAB
ALBUMIN SERPL-MCNC: 3.6 G/DL (ref 3.4–5)
ALBUMIN UR-MCNC: NEGATIVE MG/DL
ALP SERPL-CCNC: 101 U/L (ref 40–150)
ALT SERPL W P-5'-P-CCNC: 44 U/L (ref 0–50)
ANION GAP SERPL CALCULATED.3IONS-SCNC: 7 MMOL/L (ref 3–14)
APPEARANCE UR: CLEAR
AST SERPL W P-5'-P-CCNC: 38 U/L (ref 0–45)
BACTERIA #/AREA URNS HPF: ABNORMAL /HPF
BASOPHILS # BLD AUTO: 0 10E9/L (ref 0–0.2)
BASOPHILS NFR BLD AUTO: 0.2 %
BILIRUB SERPL-MCNC: 1.3 MG/DL (ref 0.2–1.3)
BILIRUB UR QL STRIP: NEGATIVE
BUN SERPL-MCNC: 8 MG/DL (ref 7–30)
CALCIUM SERPL-MCNC: 9.5 MG/DL (ref 8.5–10.1)
CHLORIDE SERPL-SCNC: 103 MMOL/L (ref 94–109)
CO2 SERPL-SCNC: 26 MMOL/L (ref 20–32)
COLOR UR AUTO: ABNORMAL
CREAT SERPL-MCNC: 0.68 MG/DL (ref 0.52–1.04)
CRP SERPL-MCNC: >190 MG/L (ref 0–8)
DIFFERENTIAL METHOD BLD: NORMAL
EOSINOPHIL NFR BLD AUTO: 0.1 %
ERYTHROCYTE [DISTWIDTH] IN BLOOD BY AUTOMATED COUNT: 14.3 % (ref 10–15)
GFR SERPL CREATININE-BSD FRML MDRD: >90 ML/MIN/{1.73_M2}
GLUCOSE SERPL-MCNC: 107 MG/DL (ref 70–99)
GLUCOSE UR STRIP-MCNC: NEGATIVE MG/DL
HCT VFR BLD AUTO: 42.4 % (ref 35–47)
HGB BLD-MCNC: 14 G/DL (ref 11.7–15.7)
HGB UR QL STRIP: NEGATIVE
IMM GRANULOCYTES # BLD: 0 10E9/L (ref 0–0.4)
IMM GRANULOCYTES NFR BLD: 0.3 %
KETONES UR STRIP-MCNC: 20 MG/DL
LEUKOCYTE ESTERASE UR QL STRIP: ABNORMAL
LIPASE SERPL-CCNC: 51 U/L (ref 73–393)
LYMPHOCYTES # BLD AUTO: 0.9 10E9/L (ref 0.8–5.3)
LYMPHOCYTES NFR BLD AUTO: 9.4 %
MCH RBC QN AUTO: 28.1 PG (ref 26.5–33)
MCHC RBC AUTO-ENTMCNC: 33 G/DL (ref 31.5–36.5)
MCV RBC AUTO: 85 FL (ref 78–100)
MONOCYTES # BLD AUTO: 0.4 10E9/L (ref 0–1.3)
MONOCYTES NFR BLD AUTO: 4.9 %
MUCOUS THREADS #/AREA URNS LPF: PRESENT /LPF
NEUTROPHILS # BLD AUTO: 7.7 10E9/L (ref 1.6–8.3)
NEUTROPHILS NFR BLD AUTO: 85.1 %
NITRATE UR QL: NEGATIVE
NRBC # BLD AUTO: 0 10*3/UL
NRBC BLD AUTO-RTO: 0 /100
PH UR STRIP: 7 PH (ref 5–7)
PLATELET # BLD AUTO: 167 10E9/L (ref 150–450)
POTASSIUM SERPL-SCNC: 3.8 MMOL/L (ref 3.4–5.3)
PROT SERPL-MCNC: 7.7 G/DL (ref 6.8–8.8)
RBC # BLD AUTO: 4.98 10E12/L (ref 3.8–5.2)
RBC #/AREA URNS AUTO: 0 /HPF (ref 0–2)
SODIUM SERPL-SCNC: 136 MMOL/L (ref 133–144)
SOURCE: ABNORMAL
SP GR UR STRIP: 1.01 (ref 1–1.03)
SQUAMOUS #/AREA URNS AUTO: 4 /HPF (ref 0–1)
UROBILINOGEN UR STRIP-MCNC: 0 MG/DL (ref 0–2)
WBC # BLD AUTO: 9 10E9/L (ref 4–11)
WBC #/AREA URNS AUTO: 6 /HPF (ref 0–5)

## 2020-03-02 PROCEDURE — 25000128 H RX IP 250 OP 636: Performed by: PHYSICIAN ASSISTANT

## 2020-03-02 PROCEDURE — 25800030 ZZH RX IP 258 OP 636: Performed by: PHYSICIAN ASSISTANT

## 2020-03-02 PROCEDURE — 80053 COMPREHEN METABOLIC PANEL: CPT | Performed by: PHYSICIAN ASSISTANT

## 2020-03-02 PROCEDURE — 96375 TX/PRO/DX INJ NEW DRUG ADDON: CPT | Performed by: PHYSICIAN ASSISTANT

## 2020-03-02 PROCEDURE — 96361 HYDRATE IV INFUSION ADD-ON: CPT | Performed by: PHYSICIAN ASSISTANT

## 2020-03-02 PROCEDURE — 85025 COMPLETE CBC W/AUTO DIFF WBC: CPT | Performed by: PHYSICIAN ASSISTANT

## 2020-03-02 PROCEDURE — 99285 EMERGENCY DEPT VISIT HI MDM: CPT | Mod: Z6 | Performed by: PHYSICIAN ASSISTANT

## 2020-03-02 PROCEDURE — 83690 ASSAY OF LIPASE: CPT | Performed by: PHYSICIAN ASSISTANT

## 2020-03-02 PROCEDURE — 96374 THER/PROPH/DIAG INJ IV PUSH: CPT | Mod: 59 | Performed by: PHYSICIAN ASSISTANT

## 2020-03-02 PROCEDURE — 81001 URINALYSIS AUTO W/SCOPE: CPT | Performed by: PHYSICIAN ASSISTANT

## 2020-03-02 PROCEDURE — 74177 CT ABD & PELVIS W/CONTRAST: CPT

## 2020-03-02 PROCEDURE — 99285 EMERGENCY DEPT VISIT HI MDM: CPT | Mod: 25 | Performed by: PHYSICIAN ASSISTANT

## 2020-03-02 PROCEDURE — 25000125 ZZHC RX 250: Performed by: PHYSICIAN ASSISTANT

## 2020-03-02 PROCEDURE — 86140 C-REACTIVE PROTEIN: CPT | Performed by: PHYSICIAN ASSISTANT

## 2020-03-02 RX ORDER — METRONIDAZOLE 500 MG/1
500 TABLET ORAL 3 TIMES DAILY
Qty: 30 TABLET | Refills: 0 | Status: SHIPPED | OUTPATIENT
Start: 2020-03-02 | End: 2020-03-12

## 2020-03-02 RX ORDER — ONDANSETRON 2 MG/ML
4 INJECTION INTRAMUSCULAR; INTRAVENOUS EVERY 30 MIN PRN
Status: DISCONTINUED | OUTPATIENT
Start: 2020-03-02 | End: 2020-03-02 | Stop reason: HOSPADM

## 2020-03-02 RX ORDER — IOPAMIDOL 755 MG/ML
500 INJECTION, SOLUTION INTRAVASCULAR ONCE
Status: COMPLETED | OUTPATIENT
Start: 2020-03-02 | End: 2020-03-02

## 2020-03-02 RX ORDER — HYDROMORPHONE HYDROCHLORIDE 1 MG/ML
0.5 INJECTION, SOLUTION INTRAMUSCULAR; INTRAVENOUS; SUBCUTANEOUS
Status: DISCONTINUED | OUTPATIENT
Start: 2020-03-02 | End: 2020-03-02 | Stop reason: HOSPADM

## 2020-03-02 RX ORDER — CIPROFLOXACIN 500 MG/1
500 TABLET, FILM COATED ORAL 2 TIMES DAILY
Qty: 20 TABLET | Refills: 0 | Status: SHIPPED | OUTPATIENT
Start: 2020-03-02 | End: 2020-03-11 | Stop reason: SINTOL

## 2020-03-02 RX ORDER — SODIUM CHLORIDE 9 MG/ML
1000 INJECTION, SOLUTION INTRAVENOUS CONTINUOUS
Status: DISCONTINUED | OUTPATIENT
Start: 2020-03-02 | End: 2020-03-02 | Stop reason: HOSPADM

## 2020-03-02 RX ADMIN — SODIUM CHLORIDE 1000 ML: 9 INJECTION, SOLUTION INTRAVENOUS at 16:05

## 2020-03-02 RX ADMIN — HYDROMORPHONE HYDROCHLORIDE 0.5 MG: 1 INJECTION, SOLUTION INTRAMUSCULAR; INTRAVENOUS; SUBCUTANEOUS at 16:07

## 2020-03-02 RX ADMIN — ONDANSETRON 4 MG: 2 INJECTION INTRAMUSCULAR; INTRAVENOUS at 16:05

## 2020-03-02 RX ADMIN — SODIUM CHLORIDE, PRESERVATIVE FREE 60 ML: 5 INJECTION INTRAVENOUS at 16:47

## 2020-03-02 RX ADMIN — IOPAMIDOL 71 ML: 755 INJECTION, SOLUTION INTRAVENOUS at 16:47

## 2020-03-02 ASSESSMENT — MIFFLIN-ST. JEOR: SCORE: 1264.47

## 2020-03-02 NOTE — ED AVS SNAPSHOT
[facility]  [address] [place] [address]  Phone:  [phone]  Fax:  [phone]                                    Kathy Davidson   MRN: [id number]    Department:  [facility]   Date of Visit:  3/2/2020           After Visit Summary Signature Page    I have received my discharge instructions, and my questions have been answered. I have discussed any challenges I see with this plan with the nurse or doctor.    ..........................................................................................................................................  Patient/Patient Representative Signature      ..........................................................................................................................................  Patient Representative Print Name and Relationship to Patient    ..................................................               ................................................  Date                                   Time    ..........................................................................................................................................  Reviewed by Signature/Title    ...................................................              ..............................................  Date                                               Time          22EPIC Rev 08/18

## 2020-03-02 NOTE — ED TRIAGE NOTES
Left sided abdominal pain that wraps around her back for 3 days.  Did have heart burn earlier last week.  Has been taking stool softeners with no relief, not eating since Saturday.

## 2020-03-02 NOTE — TELEPHONE ENCOUNTER
Spoke to patient    Onset 2 days  LUQ pain, radiates to her back  Moderate, dull  Never felt pain in this area before  Gradual onset on Saturday  Becoming worse today, as she is not taking vicodin for back pain today as she had been since it began    Taking simethecone and dulcoloax  LBM: today, thinner/softer than her usual    DISPOSITION: GO TO ED NOW: You need to be seen in the Emergency Department. Go to the ER at closest Hospital. Leave now. Drive carefully.  - She will keep tomorrow's appt as scheduled for now, will call back to cancel/reschedule as needed.    VALENTÍN SalinasN, RN, PHN      Reason for Disposition    Pain lasting > 10 minutes and over 50 years old    Additional Information    Negative: Passed out (i.e., fainted, collapsed and was not responding)    Negative: Shock suspected (e.g., cold/pale/clammy skin, too weak to stand, low BP, rapid pulse)    Negative: Visible sweat on face or sweat is dripping down    Negative: Chest pain    Protocols used: ABDOMINAL PAIN - UPPER-A-OH

## 2020-03-02 NOTE — ED PROVIDER NOTES
History     Chief Complaint   Patient presents with     Abdominal Pain     HPI  Kathy Davidson is a 54 year old female who presents to the emergency department complaining of abdominal pain. The patient reports about 3 days ago she developed abdominal pain.  Started in her right lower abdomen but has since moved into her left side abdomen.  The pain has been constant and worsening.  She also notes that around the time of onset of pain her abdomen became very bloated and distended.  She thought pain was related to gas or constipation so she has been taking simethicone but that did not help.  She tried Dulcolax but that did not alleviate symptoms though she did have some bowel movements.  Today she took a Fleet enema thinking if it was gas and constipation this would help but she passed mainly just water and a small amount of stool.  She has not had much to eat for the last couple days due to discomfort and decreased appetite.  She has not had any nausea or vomiting.  There has been no blood in her stool and she denies pain with bowel movements.  She has not had any fevers.  Yesterday she did try a tablet of Vicodin she has at home prescribed for her low back pain to see if that would help the abdominal pain but she did not take anything today because she did not want to mask symptoms in case something more serious was going on.  She tried to get into the clinic today and has an appointment tomorrow but due to severity of symptoms she came here.  She has not had any urinary symptoms.  She has had a tubal ligation but otherwise no abdominal surgeries.        Allergies:  Allergies   Allergen Reactions     Atorvastatin      Muscle aches       Problem List:    Patient Active Problem List    Diagnosis Date Noted     Nausea 02/29/2016     Priority: Medium     Elevated glucose 02/29/2016     Priority: Medium     Vitamin D deficiency 05/02/2014     Priority: Medium     Problem list name updated by automated process. Provider  to review       ASCUS favor benign 05/02/2014     Priority: Medium     5/2/14: Pap - ASCUS, Neg HPV. Plan cotest in 3 years.        Generalized anxiety disorder 01/24/2014     Priority: Medium     Diagnosis updated by automated process. Provider to review and confirm.       Family history of colon cancer-mother 09/13/2013     Priority: Medium     Rosacea 02/21/2013     Priority: Medium     Elevated blood pressure reading without diagnosis of hypertension 02/21/2013     Priority: Medium     Attention deficit disorder of adult 09/18/2012     Priority: Medium     Family history of coronary artery disease 09/18/2012     Priority: Medium     Chronic right SI joint pain 09/18/2012     Priority: Medium     DDD (degenerative disc disease), lumbar 09/18/2012     Priority: Medium     24 hour contact handout given 09/18/2012     Priority: Medium     EMERGENCY CARE PLAN  Presenting Problem Signs and Symptoms Treatment Plan    Questions or conerns during clinic hours    I will call the clinic directly     Questions or conerns outside clinic hours    I will call the 24 hour nurse line at [phone]    Patient needs to schedule an appointment    I will call the 24 hour scheduling team at [phone] or clinic directly    Same day treatment     I will call the clinic first, nurse line if after hours, urgent care and express care if needed                                 CARDIOVASCULAR SCREENING; LDL GOAL LESS THAN 160 09/17/2012     Priority: Medium        Past Medical History:    Past Medical History:   Diagnosis Date     ASCUS favor benign 5/2014     Attention deficit disorder of adult 9/18/2012     CARDIOVASCULAR SCREENING; LDL GOAL LESS THAN 160 9/17/2012     Chronic right SI joint pain 9/18/2012     DDD (degenerative disc disease), lumbar 9/18/2012     Family history of coronary artery disease 9/18/2012       Past Surgical History:    Past Surgical History:   Procedure Laterality Date     H ABLATION SVT  2000     LUMPECTOMY  BREAST      right breast.  non-cancerous      TUBAL LIGATION  2000       Family History:    Family History   Problem Relation Age of Onset     Cancer - colorectal Mother      Depression Mother      Psychotic Disorder Mother      Thyroid Disease Father      Lipids Father      Heart Disease Father      Cardiovascular Father      Alcohol/Drug Father      Hypertension Father      Cerebrovascular Disease Maternal Grandfather      Diabetes Paternal Grandmother      Cerebrovascular Disease Paternal Grandfather      Alcohol/Drug Paternal Grandfather      Alcohol/Drug Brother      Gynecology Sister      Cardiovascular Daughter      Congenital Anomalies Daughter      Cancer - colorectal Brother      Psychotic Disorder Son         ADHD     Psychotic Disorder Son         Autism     Cancer Brother         bladder cancer     Colon Cancer Brother      Alzheimer Disease Brother      Esophageal Cancer Brother        Social History:  Marital Status:   [2]  Social History     Tobacco Use     Smoking status: Never Smoker     Smokeless tobacco: Never Used   Substance Use Topics     Alcohol use: Yes     Comment: weekends, never during the week     Drug use: No        Medications:    ciprofloxacin (CIPRO) 500 MG tablet  metroNIDAZOLE (FLAGYL) 500 MG tablet  amphetamine-dextroamphetamine (ADDERALL) 20 MG tablet  Cyanocobalamin (VITAMIN B-12 PO)  cyclobenzaprine (FLEXERIL) 10 MG tablet  diazepam (VALIUM) 5 MG tablet  fish oil-omega-3 fatty acids (FISH OIL) 1000 MG capsule  HYDROcodone-acetaminophen (NORCO) 5-325 MG tablet  medical cannabis (Patient's own supply)  ondansetron (ZOFRAN) 4 MG tablet  ondansetron (ZOFRAN-ODT) 4 MG ODT tab  pseudoePHEDrine (SUDAFED) 120 MG 12 hr tablet  tretinoin (RETIN-A) 0.05 % external cream  VITAMIN D, CHOLECALCIFEROL, PO          Review of Systems   All other systems reviewed and are negative.      Physical Exam   BP: 133/84  Pulse: 102  Heart Rate: 90  Temp: 98.6  F (37  C)  Resp: 19  Height: 167.6  "cm (5' 6\")  Weight: 65.8 kg (145 lb)  SpO2: 99 %      Physical Exam  Vitals signs and nursing note reviewed.   Constitutional:       Appearance: She is well-developed. She is not ill-appearing, toxic-appearing or diaphoretic.      Comments: Appears uncomfortable   HENT:      Head: Normocephalic and atraumatic.      Mouth/Throat:      Mouth: Mucous membranes are moist.   Eyes:      Conjunctiva/sclera: Conjunctivae normal.      Pupils: Pupils are equal, round, and reactive to light.   Neck:      Musculoskeletal: Neck supple.   Cardiovascular:      Rate and Rhythm: Normal rate and regular rhythm.      Heart sounds: Normal heart sounds.   Pulmonary:      Effort: Pulmonary effort is normal. No respiratory distress.      Breath sounds: Normal breath sounds.   Abdominal:      General: Bowel sounds are decreased. There is distension.      Palpations: Abdomen is soft.      Tenderness: There is generalized abdominal tenderness (most prominent in RLQ and LUQ). There is left CVA tenderness and guarding (in left upper, right upper, and RLQ). There is no right CVA tenderness. Positive signs include McBurney's sign.   Musculoskeletal:         General: No deformity.   Skin:     General: Skin is warm and dry.   Neurological:      Mental Status: She is alert and oriented to person, place, and time.      Coordination: Coordination normal.         ED Course        Procedures        Results for orders placed or performed during the hospital encounter of 03/02/20 (from the past 24 hour(s))   CBC with platelets differential   Result Value Ref Range    WBC 9.0 4.0 - 11.0 10e9/L    RBC Count 4.98 3.8 - 5.2 10e12/L    Hemoglobin 14.0 11.7 - 15.7 g/dL    Hematocrit 42.4 35.0 - 47.0 %    MCV 85 78 - 100 fl    MCH 28.1 26.5 - 33.0 pg    MCHC 33.0 31.5 - 36.5 g/dL    RDW 14.3 10.0 - 15.0 %    Platelet Count 167 150 - 450 10e9/L    Diff Method Automated Method     % Neutrophils 85.1 %    % Lymphocytes 9.4 %    % Monocytes 4.9 %    % Eosinophils " 0.1 %    % Basophils 0.2 %    % Immature Granulocytes 0.3 %    Nucleated RBCs 0 0 /100    Absolute Neutrophil 7.7 1.6 - 8.3 10e9/L    Absolute Lymphocytes 0.9 0.8 - 5.3 10e9/L    Absolute Monocytes 0.4 0.0 - 1.3 10e9/L    Absolute Basophils 0.0 0.0 - 0.2 10e9/L    Abs Immature Granulocytes 0.0 0 - 0.4 10e9/L    Absolute Nucleated RBC 0.0    Comprehensive metabolic panel   Result Value Ref Range    Sodium 136 133 - 144 mmol/L    Potassium 3.8 3.4 - 5.3 mmol/L    Chloride 103 94 - 109 mmol/L    Carbon Dioxide 26 20 - 32 mmol/L    Anion Gap 7 3 - 14 mmol/L    Glucose 107 (H) 70 - 99 mg/dL    Urea Nitrogen 8 7 - 30 mg/dL    Creatinine 0.68 0.52 - 1.04 mg/dL    GFR Estimate >90 >60 mL/min/[1.73_m2]    GFR Estimate If Black >90 >60 mL/min/[1.73_m2]    Calcium 9.5 8.5 - 10.1 mg/dL    Bilirubin Total 1.3 0.2 - 1.3 mg/dL    Albumin 3.6 3.4 - 5.0 g/dL    Protein Total 7.7 6.8 - 8.8 g/dL    Alkaline Phosphatase 101 40 - 150 U/L    ALT 44 0 - 50 U/L    AST 38 0 - 45 U/L   Lipase   Result Value Ref Range    Lipase 51 (L) 73 - 393 U/L   CRP inflammation   Result Value Ref Range    CRP Inflammation >190.0 (H) 0.0 - 8.0 mg/L   CT Abdomen Pelvis w Contrast    Narrative    CT ABDOMEN PELVIS WITH CONTRAST   3/2/2020 4:56 PM     HISTORY: Abdominal pain and distention.    TECHNIQUE: 71mL Isovue 370 IV were administered. After contrast  administration, volumetric helical sections were acquired from the  lung bases to the ischial tuberosities. Coronal images were also  reconstructed. Radiation dose for this scan was reduced using  automated exposure control, adjustment of the mA and/or kV according  to patient size, or iterative reconstruction technique.    COMPARISON: None.     FINDINGS: Colonic diverticulosis. Focal bowel wall thickening in the  mid descending colon, with mild surrounding fat stranding, is  consistent with acute diverticulitis. No evidence for associated fluid  collection to suggest diverticular abscess. No bowel  obstruction. No  free intraperitoneal air. Unremarkable appendix. No free fluid in the  pelvis. The liver, gallbladder, spleen, adrenal glands, pancreas, and  kidneys are unremarkable. No hydronephrosis. Mild atherosclerotic  aortoiliac calcification. No free intraperitoneal air. The visualized  lung bases are clear. Degenerative changes are noted at the  lumbosacral interspace.      Impression    IMPRESSION: Acute diverticulitis in the mid descending colon. No  evidence for associated abscess.    DERIAN PABON MD   UA with Microscopic   Result Value Ref Range    Color Urine Straw     Appearance Urine Clear     Glucose Urine Negative NEG^Negative mg/dL    Bilirubin Urine Negative NEG^Negative    Ketones Urine 20 (A) NEG^Negative mg/dL    Specific Gravity Urine 1.011 1.003 - 1.035    Blood Urine Negative NEG^Negative    pH Urine 7.0 5.0 - 7.0 pH    Protein Albumin Urine Negative NEG^Negative mg/dL    Urobilinogen mg/dL 0.0 0.0 - 2.0 mg/dL    Nitrite Urine Negative NEG^Negative    Leukocyte Esterase Urine Trace (A) NEG^Negative    Source Midstream Urine     WBC Urine 6 (H) 0 - 5 /HPF    RBC Urine 0 0 - 2 /HPF    Bacteria Urine Few (A) NEG^Negative /HPF    Squamous Epithelial /HPF Urine 4 (H) 0 - 1 /HPF    Mucous Urine Present (A) NEG^Negative /LPF       Medications   0.9% sodium chloride BOLUS (0 mLs Intravenous Stopped 3/2/20 1744)   iopamidol (ISOVUE-370) solution 500 mL (71 mLs Intravenous Given 3/2/20 1647)   Sodium Chloride 0.9 % bag 100mL for CT scan (60 mLs Intravenous Given 3/2/20 1647)   sodium chloride (PF) 0.9% PF flush 3 mL (3 mLs Intracatheter Given 3/2/20 1646)       Assessments & Plan (with Medical Decision Making)  Kathy Davidson is a 54 year old female who presented to the ED with a 3-day history of left-sided abdominal pain, constant and worsening.  Reports decreased appetite and bloating but otherwise no other symptoms.  Unrelieved with stool softeners.  On arrival to the ED the patient was mildly  tachycardic with otherwise normal vital signs.  Exam with tenderness and guarding to the left upper, right upper, and right lower quadrants, worst in the left upper region.  Concern for intra-abdominal pathology such as appendicitis, diverticulitis, pancreatitis.  IV established and labs drawn.  Patient given fluids and Dilaudid for pain control with good relief.  Her white count today was normal at 9.0 however CRP markedly elevated at >190.  CMP unremarkable and lipase was normal.  Urinalysis with trace leukocyte esterase and 6 WBCs, no blood to suggest renal stone.  CT scan revealed acute diverticulitis in the descending colon without evidence of perforation or abscess.  These results were discussed with the patient.  Clinically there are no signs of sepsis or complications from his diverticulitis I think it reasonable to trial treatment in the outpatient setting with oral antibiotics.  Patient very comfortable with this plan.  She will be prescribed ciprofloxacin and Flagyl for 10-day course.  Discussed pain management options and she does have a prescription of Vicodin at home that she will use as needed, advised to let her PCP know she is doing this since this is actually for her chronic back pain issues.  I discussed dietary adjustments, increasing fluid intake as well.  She should follow-up with her PCP in the next 1 to 2 weeks as needed for reassessment.  I went over warning signs and symptoms of when to return to the ED.  All questions answered and the patient was discharged home in suitable condition.     I have reviewed the nursing notes.    I have reviewed the findings, diagnosis, plan and need for follow up with the patient.    Discharge Medication List as of 3/2/2020  5:44 PM      START taking these medications    Details   ciprofloxacin (CIPRO) 500 MG tablet Take 1 tablet (500 mg) by mouth 2 times daily for 10 days, Disp-20 tablet, R-0, E-Prescribe      metroNIDAZOLE (FLAGYL) 500 MG tablet Take 1  tablet (500 mg) by mouth 3 times daily for 10 days, Disp-30 tablet, R-0, E-PrescribeEat yogurt or cottage cheese daily to prevent diarrhea that can be caused by taking this medication.             Final diagnoses:   Diverticulitis large intestine w/o perforation or abscess w/o bleeding     Note: Chart documentation done in part with Dragon Voice Recognition software. Although reviewed after completion, some word and grammatical errors may remain.     3/2/2020   [facility] EMERGENCY DEPARTMENT     Ruth Cisse PA-C  03/02/20 [id number]

## 2020-03-02 NOTE — DISCHARGE INSTRUCTIONS
Please start the antibiotics tonight and take as directed.  Use the Vicodin you have at home as needed for pain control.  Be sure you are drinking lots of fluids and eating a high-fiber diet.  Let your primary care provider know you were here today and schedule follow-up as needed.  If you develop any worsening symptoms please return to the emergency department.    Thank you for choosing [facility] [place] Emergency [facility]. It was a pleasure taking care of you today. If you have any questions, please call [phone].    Ruth Cisse PA-C

## 2020-03-10 ENCOUNTER — NURSE TRIAGE (OUTPATIENT)
Dept: FAMILY MEDICINE | Facility: OTHER | Age: 55
End: 2020-03-10

## 2020-03-10 ENCOUNTER — TELEPHONE (OUTPATIENT)
Dept: NURSING | Facility: CLINIC | Age: 55
End: 2020-03-10

## 2020-03-10 NOTE — TELEPHONE ENCOUNTER
Reason for call:  Patient reporting a symptom    Symptom or request: pt is concerned she is having a reaction to the ciprofloxacin she was prescribed.   She has a painful, red swollen and hot to the touch right ankle which she did not injure in anyway. Slight knee pain.    Duration (how long have symptoms been present): 24 hours    Have you been treated for this before? No    Additional comments: patient is concerned about spontaneous tendin rupture.     Phone Number patient can be reached at:  Home number on file [phone] (home)    Best Time:  any    Can we leave a detailed message on this number:  YES    Call taken on 3/10/2020 at 11:25 AM by Sheila Medeiros

## 2020-03-10 NOTE — TELEPHONE ENCOUNTER
Spoke to patient, ankle pain since yesterday  Started Cipro and Flagyl on 3/2/20 for diverticulitis, as prescribed by the ED  Ankle is swollen and red and painful  Cannot bear weight  No fever or break to the skin    Joint and tendon problems is listed as a possible side effect of Cipro    DISPOSITION: go to ED  - she agrees to do this now and contact the clinic for follow up as directed    Rita Monahan, VALENTÍNN, RN, PHN

## 2020-03-10 NOTE — TELEPHONE ENCOUNTER
Patient calling, she spoke to the [facility] clinic today (to a nurse). She was seen in ER/[facility] a few days ago and started on Cipro and Flagyl for diverticultis. Today she  noticed a reddened and swollen ankle. It is painful, but she ABLE to bear weight and ambulate without too much difficulty. She had no known injury Patient feels she was misunderstood by the clinic. She does not want to go to the ER for what she feels is a minor a problem that may be related to taking Cipro, even though she the clinic told her she should follow up with ER since ER prescribed the Cipro. She just wants to make an appointment to see her PCP and possibly get a different medication rather than rack up another huge ER expense. She said she will definitely go to ER if her symptoms worsen.  Nicky Connor RN  [facility]

## 2020-03-10 NOTE — PROGRESS NOTES
Subjective     Kathy Davidson is a 54 year old female who presents to clinic today for the following health issues:    HPI   ED/UC Followup:    Facility:  [facility]   Date of visit: 3/2/2020  Reason for visit: Diverticulitis large intestine w/o perforation or abscess w/o bleeding   Current Status: Stopped taking Cipro on Monday as she had a side effect. She currently has a swollen ankle and achilles tendon pain. Would like to find another antibiotic to finish out her course of antibiotics for diverticulitis. She has an appointment with orthopedics. She currently has no pain from her diverticulitis unless she pushes on it and she is currently on a clear diet.   Looking for something for ankle pain without tylenol due to diverticulitis.        Patient Active Problem List   Diagnosis     CARDIOVASCULAR SCREENING; LDL GOAL LESS THAN 160     Attention deficit disorder of adult     Family history of coronary artery disease     Chronic right SI joint pain     DDD (degenerative disc disease), lumbar     24 hour contact handout given     Rosacea     Elevated blood pressure reading without diagnosis of hypertension     Family history of colon cancer-mother     Generalized anxiety disorder     Vitamin D deficiency     ASCUS favor benign     Nausea     Elevated glucose     Past Surgical History:   Procedure Laterality Date     H ABLATION SVT  2000     LUMPECTOMY BREAST      right breast.  non-cancerous      TUBAL LIGATION  2000       Social History     Tobacco Use     Smoking status: Never Smoker     Smokeless tobacco: Never Used   Substance Use Topics     Alcohol use: Yes     Comment: weekends, never during the week     Family History   Problem Relation Age of Onset     Cancer - colorectal Mother      Depression Mother      Psychotic Disorder Mother      Thyroid Disease Father      Lipids Father      Heart Disease Father      Cardiovascular Father      Alcohol/Drug Father      Hypertension Father      Cerebrovascular  Disease Maternal Grandfather      Diabetes Paternal Grandmother      Cerebrovascular Disease Paternal Grandfather      Alcohol/Drug Paternal Grandfather      Alcohol/Drug Brother      Gynecology Sister      Cardiovascular Daughter      Congenital Anomalies Daughter      Cancer - colorectal Brother      Psychotic Disorder Son         ADHD     Psychotic Disorder Son         Autism     Cancer Brother         bladder cancer     Colon Cancer Brother      Alzheimer Disease Brother      Esophageal Cancer Brother          Current Outpatient Medications   Medication Sig Dispense Refill     amphetamine-dextroamphetamine (ADDERALL) 20 MG tablet Take 1 tablet by mouth 2 times daily as needed 60 tablet 0     Cyanocobalamin (VITAMIN B-12 PO)        cyclobenzaprine (FLEXERIL) 10 MG tablet Take 1 tablet (10 mg) by mouth 3 times daily as needed for muscle spasms 60 tablet 1     diazepam (VALIUM) 5 MG tablet Take 1 tablet (5 mg) by mouth as needed (for anxiety related to flying) 10 tablet 0     fish oil-omega-3 fatty acids (FISH OIL) 1000 MG capsule Take 2 g by mouth 2 times daily.       HYDROcodone-acetaminophen (NORCO) 5-325 MG tablet Take 1-2 tablets by mouth every 4 hours as needed for pain 18 tablet 0     medical cannabis (Patient's own supply) See Admin Instructions (The purpose of this order is to document that the patient reports taking medical cannabis.  This is not a prescription, and is not used to certify that the patient has a qualifying medical condition.)       ondansetron (ZOFRAN) 4 MG tablet Take 1 tablet (4 mg) by mouth every 8 hours as needed for nausea or vomiting 18 tablet 3     ondansetron (ZOFRAN-ODT) 4 MG ODT tab Take 1 tablet (4 mg) by mouth every 8 hours as needed for nausea 30 tablet 3     pseudoePHEDrine (SUDAFED) 120 MG 12 hr tablet Take 1 tablet (120 mg) by mouth daily 90 tablet 3     sulfamethoxazole-trimethoprim (BACTRIM DS) 800-160 MG tablet Take 1 tablet by mouth 2 times daily for 7 days 14 tablet 0  "    tretinoin (RETIN-A) 0.05 % external cream Apply topically At Bedtime 45 g 3     VITAMIN D, CHOLECALCIFEROL, PO Take 1,000 Units by mouth daily       metroNIDAZOLE (FLAGYL) 500 MG tablet Take 1 tablet (500 mg) by mouth 3 times daily for 10 days (Patient not taking: Reported on 3/11/2020) 30 tablet 0     Allergies   Allergen Reactions     Atorvastatin      Muscle aches     Ciprofloxacin Swelling     Recent Labs   Lab Test 03/02/20  1603 01/11/19 10/23/17  1101 05/02/14  1031   A1C  --   --  5.4  --    LDL  --  170* 142* 186*   HDL  --  58 58 68   TRIG  --  200* 316* 142   ALT 44  --   --   --    CR 0.68  --  0.75 0.94   GFRESTIMATED >90  --  80 63   GFRESTBLACK >90  --  >90 76   POTASSIUM 3.8  --  4.1 4.6   TSH  --  1.14  --   --       BP Readings from Last 3 Encounters:   03/11/20 106/74   03/02/20 131/75   01/15/20 118/86    Wt Readings from Last 3 Encounters:   03/11/20 63.4 kg (139 lb 11.2 oz)   03/02/20 65.8 kg (145 lb)   01/15/20 64.8 kg (142 lb 14.4 oz)                    Reviewed and updated as needed this visit by Provider         Review of Systems   ROS COMP: Constitutional, HEENT, cardiovascular, pulmonary, gi and gu systems are negative, except as otherwise noted.      Objective    /74   Pulse 75   Temp 97.9  F (36.6  C) (Temporal)   Resp 16   Ht 1.676 m (5' 5.98\")   Wt 63.4 kg (139 lb 11.2 oz)   SpO2 100%   BMI 22.56 kg/m    Body mass index is 22.56 kg/m .  Physical Exam   GENERAL: alert and no distress  RESP: lungs clear to auscultation - no rales, rhonchi or wheezes  CV: regular rate and rhythm, normal S1 S2, no S3 or S4, no murmur, click or rub  ABDOMEN: tenderness LLQ, no organomegaly or masses and bowel sounds normal  MS: tenderness of right Hague's tendon and localized swelling and warmth of the right ankle  SKIN: no suspicious lesions or rashes  NEURO: Normal strength and tone, mentation intact and speech normal  PSYCH: mentation appears normal, affect normal/bright    Diagnostic " Test Results:  Labs reviewed in Epic        Assessment & Plan     1. Diverticulitis of colon  Still having tenderness over the LLQ and did not complete the full course of metronidazole and cipro due to Quimby's tendonitis. Recommend she does a 7 day course of Bactrim. Follow up if symptoms persist or worsen  - sulfamethoxazole-trimethoprim (BACTRIM DS) 800-160 MG tablet; Take 1 tablet by mouth 2 times daily for 7 days  Dispense: 14 tablet; Refill: 0    2. Adverse effect of ciprofloxacin, initial encounter  Right Francisco's tendonitis. Is seeing [facility] physician tomorrow.       No follow-ups on file.    ABIGAIL Null [facility]

## 2020-03-11 ENCOUNTER — OFFICE VISIT (OUTPATIENT)
Dept: FAMILY MEDICINE | Facility: OTHER | Age: 55
End: 2020-03-11
Payer: COMMERCIAL

## 2020-03-11 VITALS
RESPIRATION RATE: 16 BRPM | WEIGHT: 139.7 LBS | HEART RATE: 75 BPM | DIASTOLIC BLOOD PRESSURE: 74 MMHG | BODY MASS INDEX: 22.45 KG/M2 | HEIGHT: 66 IN | SYSTOLIC BLOOD PRESSURE: 106 MMHG | TEMPERATURE: 97.9 F | OXYGEN SATURATION: 100 %

## 2020-03-11 DIAGNOSIS — K57.32 DIVERTICULITIS OF COLON: Primary | ICD-10-CM

## 2020-03-11 DIAGNOSIS — T36.8X5A: ICD-10-CM

## 2020-03-11 PROCEDURE — 99213 OFFICE O/P EST LOW 20 MIN: CPT | Performed by: STUDENT IN AN ORGANIZED HEALTH CARE EDUCATION/TRAINING PROGRAM

## 2020-03-11 RX ORDER — SULFAMETHOXAZOLE/TRIMETHOPRIM 800-160 MG
1 TABLET ORAL 2 TIMES DAILY
Qty: 20 TABLET | Refills: 0 | Status: SHIPPED | OUTPATIENT
Start: 2020-03-11 | End: 2020-03-11

## 2020-03-11 RX ORDER — SULFAMETHOXAZOLE/TRIMETHOPRIM 800-160 MG
1 TABLET ORAL 2 TIMES DAILY
Qty: 14 TABLET | Refills: 0 | Status: SHIPPED | OUTPATIENT
Start: 2020-03-11 | End: 2020-03-18

## 2020-03-11 ASSESSMENT — MIFFLIN-ST. JEOR: SCORE: 1240.18

## 2020-03-22 ENCOUNTER — HEALTH MAINTENANCE LETTER (OUTPATIENT)
Age: 55
End: 2020-03-22

## 2020-07-10 ENCOUNTER — VIRTUAL VISIT (OUTPATIENT)
Dept: FAMILY MEDICINE | Facility: OTHER | Age: 55
End: 2020-07-10
Payer: COMMERCIAL

## 2020-07-10 DIAGNOSIS — F40.243 FLYING PHOBIA: ICD-10-CM

## 2020-07-10 DIAGNOSIS — M54.50 BILATERAL LOW BACK PAIN WITHOUT SCIATICA, UNSPECIFIED CHRONICITY: ICD-10-CM

## 2020-07-10 PROCEDURE — 99213 OFFICE O/P EST LOW 20 MIN: CPT | Mod: TEL | Performed by: STUDENT IN AN ORGANIZED HEALTH CARE EDUCATION/TRAINING PROGRAM

## 2020-07-10 RX ORDER — HYDROCODONE BITARTRATE AND ACETAMINOPHEN 5; 325 MG/1; MG/1
1-2 TABLET ORAL EVERY 4 HOURS PRN
Qty: 30 TABLET | Refills: 0 | Status: SHIPPED | OUTPATIENT
Start: 2020-07-10 | End: 2021-03-08

## 2020-07-10 RX ORDER — DIAZEPAM 5 MG
5 TABLET ORAL DAILY PRN
Qty: 10 TABLET | Refills: 0 | Status: SHIPPED | OUTPATIENT
Start: 2020-07-10 | End: 2021-03-08

## 2020-07-10 NOTE — PROGRESS NOTES
"Kathy Davidson is a 55 year old female who is being evaluated via a billable telephone visit.      The patient has been notified of following:     \"This telephone visit will be conducted via a call between you and your physician/provider. We have found that certain health care needs can be provided without the need for a physical exam.  This service lets us provide the care you need with a short phone conversation.  If a prescription is necessary we can send it directly to your pharmacy.  If lab work is needed we can place an order for that and you can then stop by our lab to have the test done at a later time.    Telephone visits are billed at different rates depending on your insurance coverage. During this emergency period, for some insurers they may be billed the same as an in-person visit.  Please reach out to your insurance provider with any questions.    If during the course of the call the physician/provider feels a telephone visit is not appropriate, you will not be charged for this service.\"    Patient has given verbal consent for Telephone visit?  Yes    What phone number would you like to be contacted at? [phone]    How would you like to obtain your AVS? Joycelyn Williamson     Kathy Davidson is a 55 year old female who presents via phone visit today for the following health issues:    HPI  Medication Followup of hydrocodone     Taking Medication as prescribed: yes    Side Effects:  None    Medication Helping Symptoms:  Yes    She had [place]'s tendonitis in the spring due to cipro for diverticulitis. She worked with a homeopath to do a detox for cipro and took arnica and another supplement for inflammation and the Quitman's started to feel better. She used hydrocodone to treat the pain during that time so she ran out sooner than she usually does. She usually uses it for low back and SI joint pain.        May need to wear a mask for upcoming appointment with the dentist. She is seeing if he would be " willing to allow her to come in at the end of the day and not have to wear a mask because she has PTSD and anything over her face is a trigger. She is wondering if she can use Valium if needed if she is required to wear a mask.     Patient Active Problem List   Diagnosis     CARDIOVASCULAR SCREENING; LDL GOAL LESS THAN 160     Attention deficit disorder of adult     Family history of coronary artery disease     Chronic right SI joint pain     DDD (degenerative disc disease), lumbar     24 hour contact handout given     Rosacea     Elevated blood pressure reading without diagnosis of hypertension     Family history of colon cancer-mother     Generalized anxiety disorder     Vitamin D deficiency     ASCUS favor benign     Nausea     Elevated glucose     Past Surgical History:   Procedure Laterality Date     H ABLATION SVT  2000     LUMPECTOMY BREAST      right breast.  non-cancerous      TUBAL LIGATION  2000       Social History     Tobacco Use     Smoking status: Never Smoker     Smokeless tobacco: Never Used   Substance Use Topics     Alcohol use: Yes     Comment: weekends, never during the week     Family History   Problem Relation Age of Onset     Cancer - colorectal Mother      Depression Mother      Psychotic Disorder Mother      Thyroid Disease Father      Lipids Father      Heart Disease Father      Cardiovascular Father      Alcohol/Drug Father      Hypertension Father      Cerebrovascular Disease Maternal Grandfather      Diabetes Paternal Grandmother      Cerebrovascular Disease Paternal Grandfather      Alcohol/Drug Paternal Grandfather      Alcohol/Drug Brother      Gynecology Sister      Cardiovascular Daughter      Congenital Anomalies Daughter      Cancer - colorectal Brother      Psychotic Disorder Son         ADHD     Psychotic Disorder Son         Autism     Cancer Brother         bladder cancer     Colon Cancer Brother      Alzheimer Disease Brother      Esophageal Cancer Brother          Current  Outpatient Medications   Medication Sig Dispense Refill     amphetamine-dextroamphetamine (ADDERALL) 20 MG tablet Take 1 tablet by mouth 2 times daily as needed 60 tablet 0     Cyanocobalamin (VITAMIN B-12 PO)        cyclobenzaprine (FLEXERIL) 10 MG tablet Take 1 tablet (10 mg) by mouth 3 times daily as needed for muscle spasms 60 tablet 1     diazepam (VALIUM) 5 MG tablet Take 1 tablet (5 mg) by mouth daily as needed (for flying or for needing to wear mask) 10 tablet 0     fish oil-omega-3 fatty acids (FISH OIL) 1000 MG capsule Take 2 g by mouth 2 times daily.       HYDROcodone-acetaminophen (NORCO) 5-325 MG tablet Take 1-2 tablets by mouth every 4 hours as needed for pain 30 tablet 0     medical cannabis (Patient's own supply) See Admin Instructions (The purpose of this order is to document that the patient reports taking medical cannabis.  This is not a prescription, and is not used to certify that the patient has a qualifying medical condition.)       ondansetron (ZOFRAN) 4 MG tablet Take 1 tablet (4 mg) by mouth every 8 hours as needed for nausea or vomiting 18 tablet 3     ondansetron (ZOFRAN-ODT) 4 MG ODT tab Take 1 tablet (4 mg) by mouth every 8 hours as needed for nausea 30 tablet 3     pseudoePHEDrine (SUDAFED) 120 MG 12 hr tablet Take 1 tablet (120 mg) by mouth daily 90 tablet 3     tretinoin (RETIN-A) 0.05 % external cream Apply topically At Bedtime 45 g 3     VITAMIN D, CHOLECALCIFEROL, PO Take 1,000 Units by mouth daily       Allergies   Allergen Reactions     Atorvastatin      Muscle aches     Ciprofloxacin Swelling     Tendonitis right [place]'s     BP Readings from Last 3 Encounters:   03/11/20 106/74   03/02/20 131/75   01/15/20 118/86    Wt Readings from Last 3 Encounters:   03/11/20 63.4 kg (139 lb 11.2 oz)   03/02/20 65.8 kg (145 lb)   01/15/20 64.8 kg (142 lb 14.4 oz)                    Reviewed and updated as needed this visit by Provider         Review of Systems   Constitutional, HEENT,  cardiovascular, pulmonary, gi and gu systems are negative, except as otherwise noted.       Objective   Reported vitals:  There were no vitals taken for this visit.   alert and no distress  PSYCH: Alert and oriented times 3; coherent speech, normal   rate and volume, able to articulate logical thoughts, able   to abstract reason, no tangential thoughts, no hallucinations   or delusions   RESP: No cough, no audible wheezing, able to talk in full sentences  Remainder of exam unable to be completed due to telephone visits    Diagnostic Test Results:  Labs reviewed in Epic        Assessment/Plan:  1. Bilateral low back pain without sciatica, unspecified chronicity  Stable. Used more Norco since she had [place]'s tendonitis from cipro in the spring. No concerns for misuse. Refill granted.   - HYDROcodone-acetaminophen (NORCO) 5-325 MG tablet; Take 1-2 tablets by mouth every 4 hours as needed for pain  Dispense: 30 tablet; Refill: 0    2. Flying phobia  May need to wear a mask for upcoming appointment with the dentist. She is seeing if he would be willing to allow her to come in at the end of the day and not have to wear a mask because she has PTSD and anything over her face is a trigger. Okay to use Valium if needed if she is required to wear a mask.   - diazepam (VALIUM) 5 MG tablet; Take 1 tablet (5 mg) by mouth daily as needed (for flying or for needing to wear mask)  Dispense: 10 tablet; Refill: 0    No follow-ups on file.      Phone call duration:  10 minutes    ABIGAIL Null CNP

## 2020-09-02 NOTE — PROGRESS NOTES
"Kathy Davidson is a 55 year old female who is being evaluated via a billable telephone visit.      The patient has been notified of following:     \"This telephone visit will be conducted via a call between you and your physician/provider. We have found that certain health care needs can be provided without the need for a physical exam.  This service lets us provide the care you need with a short phone conversation.  If a prescription is necessary we can send it directly to your pharmacy.  If lab work is needed we can place an order for that and you can then stop by our lab to have the test done at a later time.    Telephone visits are billed at different rates depending on your insurance coverage. During this emergency period, for some insurers they may be billed the same as an in-person visit.  Please reach out to your insurance provider with any questions.    If during the course of the call the physician/provider feels a telephone visit is not appropriate, you will not be charged for this service.\"    Patient has given verbal consent for Telephone visit?  Yes    What phone number would you like to be contacted at? [phone]    How would you like to obtain your AVS? Joycelyn Williamson     Kathy Davidson is a 55 year old female who presents via phone visit today for the following health issues:    HPI    Letter Request   Face mask letters for dentist, chiro, hotel and airline; pt has PTSD & sinus issues. Tried the diazepam without relief   Hotel had problem with her in [place] at a hotel.  Valium and tried with a bunch of different face coverings but all caused panic symptoms.   Shield - sinus pain and neuralgia down face. Tried mask that fits on eyeglasses but it also caused anxiety and pressure on her sinuses   Colonoscopy, Pap, mammogram needed.   Reaction to Cipro. 2-3 yeast infections after Cipro. Tried probiotics. Used OTC yeast medication.   Working with homeopath to treat recurrent yeast infections " with borax tablets vaginally per homeopath. Heavy duty probiotic.  [employer] - needs letter for medical exemption to be able to fly without mask.   Ordered a fisherman-type bucket hat with shield over face that she hasn't tried yet.        Recurrent yeast infections  Since Cipro reaction in March pt is having 2 yeast infections per month.         Review of Systems   Constitutional, HEENT, cardiovascular, pulmonary, gi and gu systems are negative, except as otherwise noted.       Objective          Vitals:  No vitals were obtained today due to virtual visit.    healthy, alert and no distress  PSYCH: Alert and oriented times 3; coherent speech, normal   rate and volume, able to articulate logical thoughts, able   to abstract reason, no tangential thoughts, no hallucinations   or delusions  Her affect is normal  RESP: No cough, no audible wheezing, able to talk in full sentences  Remainder of exam unable to be completed due to telephone visits          Assessment/Plan:    Assessment & Plan     PTSD (post-traumatic stress disorder)  Has PTSD and has tried every face covering she could find but has severe difficulty breathing due to panic symptoms from PTSD whenever she has a face covering on. Letters written for patient to medically exempt her from wearing a mask.     Encounter for screening mammogram for breast cancer  - MA SCREENING DIGITAL BILAT - Future  (s+30); Future       No follow-ups on file.    ABIGAIL Null [facility]    Phone call duration:  10 minutes

## 2020-09-08 ENCOUNTER — VIRTUAL VISIT (OUTPATIENT)
Dept: FAMILY MEDICINE | Facility: OTHER | Age: 55
End: 2020-09-08
Payer: COMMERCIAL

## 2020-09-08 DIAGNOSIS — F43.10 PTSD (POST-TRAUMATIC STRESS DISORDER): Primary | ICD-10-CM

## 2020-09-08 DIAGNOSIS — Z12.31 ENCOUNTER FOR SCREENING MAMMOGRAM FOR BREAST CANCER: ICD-10-CM

## 2020-09-08 PROCEDURE — 99213 OFFICE O/P EST LOW 20 MIN: CPT | Mod: 95 | Performed by: STUDENT IN AN ORGANIZED HEALTH CARE EDUCATION/TRAINING PROGRAM

## 2020-09-08 NOTE — LETTER
[address]  [facility] [address]  Phone: [phone]    September 15, 2020        Kathy Davidson  [address] [facility] [address]          To whom it may concern:    RE: Kathy Davidson is under my care in family practice at [facility]. She has severe difficulty with breathing when wearing a mask.  Please allow Kathy to attend appointments without the use of a mask.    Please contact me with questions or concerns.      Sincerely,          ABIGAIL Null CNP

## 2020-09-08 NOTE — LETTER
[facility]  [address]  [facility] [address]  Phone: [phone]    September 15, 2020        Kathy Davidson  [address] [facility] [address]          To whom it may concern:    RE: Kathy Davidson is under my care in family practice at [facility].  She has severe difficulty with breathing when wearing a mask due to PTSD.  We have exhausted all options for alleviating this breathing difficulty.  She must not be asked to wear a mask.  She is safe to fly.    Please contact me for questions or concerns.      Sincerely,          ABIGAIL Null CNP

## 2021-01-15 ENCOUNTER — HEALTH MAINTENANCE LETTER (OUTPATIENT)
Age: 56
End: 2021-01-15

## 2021-03-08 ENCOUNTER — VIRTUAL VISIT (OUTPATIENT)
Dept: FAMILY MEDICINE | Facility: OTHER | Age: 56
End: 2021-03-08
Payer: COMMERCIAL

## 2021-03-08 DIAGNOSIS — F40.243 FLYING PHOBIA: ICD-10-CM

## 2021-03-08 DIAGNOSIS — J32.0 CHRONIC MAXILLARY SINUSITIS: ICD-10-CM

## 2021-03-08 DIAGNOSIS — M54.30 SCIATICA, UNSPECIFIED LATERALITY: ICD-10-CM

## 2021-03-08 DIAGNOSIS — M54.50 BILATERAL LOW BACK PAIN WITHOUT SCIATICA, UNSPECIFIED CHRONICITY: ICD-10-CM

## 2021-03-08 DIAGNOSIS — R11.0 NAUSEA: ICD-10-CM

## 2021-03-08 DIAGNOSIS — L71.9 ROSACEA: ICD-10-CM

## 2021-03-08 PROCEDURE — 99214 OFFICE O/P EST MOD 30 MIN: CPT | Mod: TEL | Performed by: STUDENT IN AN ORGANIZED HEALTH CARE EDUCATION/TRAINING PROGRAM

## 2021-03-08 RX ORDER — DIAZEPAM 5 MG
5 TABLET ORAL DAILY PRN
Qty: 10 TABLET | Refills: 0 | Status: SHIPPED | OUTPATIENT
Start: 2021-03-08

## 2021-03-08 RX ORDER — HYDROCODONE BITARTRATE AND ACETAMINOPHEN 5; 325 MG/1; MG/1
1-2 TABLET ORAL EVERY 4 HOURS PRN
Qty: 30 TABLET | Refills: 0 | Status: SHIPPED | OUTPATIENT
Start: 2021-03-08

## 2021-03-08 RX ORDER — PSEUDOEPHEDRINE HCL 120 MG/1
120 TABLET, FILM COATED, EXTENDED RELEASE ORAL DAILY
Qty: 90 TABLET | Refills: 3 | Status: SHIPPED | OUTPATIENT
Start: 2021-03-08

## 2021-03-08 RX ORDER — ONDANSETRON 4 MG/1
4 TABLET, FILM COATED ORAL EVERY 8 HOURS PRN
Qty: 30 TABLET | Refills: 3 | Status: SHIPPED | OUTPATIENT
Start: 2021-03-08

## 2021-03-08 RX ORDER — TRETINOIN 0.5 MG/G
CREAM TOPICAL AT BEDTIME
Qty: 45 G | Refills: 3 | Status: SHIPPED | OUTPATIENT
Start: 2021-03-08 | End: 2021-09-24

## 2021-03-08 RX ORDER — CYCLOBENZAPRINE HCL 10 MG
10 TABLET ORAL 3 TIMES DAILY PRN
Qty: 60 TABLET | Refills: 1 | Status: SHIPPED | OUTPATIENT
Start: 2021-03-08 | End: 2022-01-19

## 2021-03-08 ASSESSMENT — PATIENT HEALTH QUESTIONNAIRE - PHQ9
SUM OF ALL RESPONSES TO PHQ QUESTIONS 1-9: 12
SUM OF ALL RESPONSES TO PHQ QUESTIONS 1-9: 12
10. IF YOU CHECKED OFF ANY PROBLEMS, HOW DIFFICULT HAVE THESE PROBLEMS MADE IT FOR YOU TO DO YOUR WORK, TAKE CARE OF THINGS AT HOME, OR GET ALONG WITH OTHER PEOPLE: EXTREMELY DIFFICULT

## 2021-03-08 ASSESSMENT — ANXIETY QUESTIONNAIRES
6. BECOMING EASILY ANNOYED OR IRRITABLE: NEARLY EVERY DAY
1. FEELING NERVOUS, ANXIOUS, OR ON EDGE: NOT AT ALL
GAD7 TOTAL SCORE: 9
7. FEELING AFRAID AS IF SOMETHING AWFUL MIGHT HAPPEN: NOT AT ALL
7. FEELING AFRAID AS IF SOMETHING AWFUL MIGHT HAPPEN: NOT AT ALL
GAD7 TOTAL SCORE: 9
3. WORRYING TOO MUCH ABOUT DIFFERENT THINGS: MORE THAN HALF THE DAYS
5. BEING SO RESTLESS THAT IT IS HARD TO SIT STILL: SEVERAL DAYS
2. NOT BEING ABLE TO STOP OR CONTROL WORRYING: MORE THAN HALF THE DAYS
GAD7 TOTAL SCORE: 9
4. TROUBLE RELAXING: SEVERAL DAYS

## 2021-03-08 NOTE — PROGRESS NOTES
Kathy is a 55 year old who is being evaluated via a billable telephone visit.      What phone number would you like to be contacted at? [phone]  How would you like to obtain your AVS? MyChart    Assessment & Plan     Nausea  Stable. Continue current medication(s) and dose(s). Flared some with sinusitis symptoms r/t Invisalign. Thinking will need surgical repair of deviated septum.   - ondansetron (ZOFRAN) 4 MG tablet; Take 1 tablet (4 mg) by mouth every 8 hours as needed for nausea or vomiting    Chronic maxillary sinusitis  See above notes  - pseudoePHEDrine (SUDAFED) 120 MG 12 hr tablet; Take 1 tablet (120 mg) by mouth daily    Bilateral low back pain without sciatica, unspecified chronicity  Stable. Using very sparingly. 10 tabs lasted one year.   - HYDROcodone-acetaminophen (NORCO) 5-325 MG tablet; Take 1-2 tablets by mouth every 4 hours as needed for pain    Rosacea  Stable. Continue current medication(s) and dose(s).   - tretinoin (RETIN-A) 0.05 % external cream; Apply topically At Bedtime    Flying phobia  Stable. Continue current medication(s) and dose(s).   - diazepam (VALIUM) 5 MG tablet; Take 1 tablet (5 mg) by mouth daily as needed (for flying or for needing to wear mask)    Sciatica, unspecified laterality  Stable. Continue current medication(s) and dose(s).   - cyclobenzaprine (FLEXERIL) 10 MG tablet; Take 1 tablet (10 mg) by mouth 3 times daily as needed for muscle spasms           Depression Screening Follow Up    PHQ 3/8/2021   PHQ-9 Total Score 12   Q9: Thoughts of better off dead/self-harm past 2 weeks Not at all         Follow Up Actions Taken  Patient counseled, no additional follow up at this time.         No follow-ups on file.    ABIGAIL Null [facility]   Kathy is a 55 year old who presents for the following health issues     History of Present Illness       She eats 4 or more servings of fruits and vegetables daily.She consumes 0  sweetened beverage(s) daily.She exercises with enough effort to increase her heart rate 20 to 29 minutes per day.  She exercises with enough effort to increase her heart rate 7 days per week.   She is taking medications regularly.     PTSD is tough with mask situation. Hard to be in public. People will give a hard time.   Kicked out of mammogram truck. Clinic manager said she had to put on a mask or leave the appointment. Yudy was unaware.   Is going to reach out to a counselor she saw before.     Invisalign for bite and sinuses have been an issue, chronic maxillary sinusitis - deviated septum  Sudafed and wedge for bed to help with pressure.    She uses Zofran as needed for nausea from chronic sinusitis and postnasal drainage that makes her nauseous. She uses it a couple times a week. She was told she needs sinus surgery as her sinuses never drain and she has a deviated septum. She has 5 children, some with special needs who she has been attending to and putting her needs second. She is thinking she will get the sinus surgery done in the near future.   She takes cyclobenzaprine for occasional flare of sciatic nerve. She has a medical CBD card and since using CBD she has only needed 60 cyclobenzaprine per year. She uses 30 tablets of Norco has lasted her over one year. She only uses this when the pain is severe and the other medications do not help the pain.     She has a fear of flying since a bad flight long ago when she was pregnant with their 2nd child and away from their first child for the first time. She goes on a plan approximately 3-4 times per year and has used diazepam prior to flying which is helpful.     She was diagnosed with ADHD and uses Adderall only when she is doing projects. 60 tablets lasts her one year.      She is due for colonoscopy in 2020 and will go to [place] to see Dr. Neff who has done them in the past. Family history of colon cancer.     She was diagnosed with PTSD after the loss of  her son during open heart surgery. She will dissociate at times as a coping mechanism. She will find herself across the store and forget how she got there. She will do this also if she sees someone treating someone with autism poorly as her youngest son has autism so she is sensitive to this.      Mammogram done at [facility] in 1/16/2019.   Colonoscopy last 3/5/15    Patient wanting refills on medication. Ondasetron, Norco, Sudafed, Flexeril, valium and tretinoin.  Wanted to go back to work but can't wear a mask.      Review of Systems   Constitutional, HEENT, cardiovascular, pulmonary, gi and gu systems are negative, except as otherwise noted.      Objective           Vitals:  No vitals were obtained today due to virtual visit.    Physical Exam   healthy, alert and no distress  PSYCH: Alert and oriented times 3; coherent speech, normal   rate and volume, able to articulate logical thoughts, able   to abstract reason, no tangential thoughts, no hallucinations   or delusions  Her affect is normal  RESP: No cough, no audible wheezing, able to talk in full sentences  Remainder of exam unable to be completed due to telephone visits              Phone call duration: 20 minutes    Answers for HPI/ROS submitted by the patient on 3/8/2021   Chronic problems general questions HPI Form  If you checked off any problems, how difficult have these problems made it for you to do your work, take care of things at home, or get along with other people?: Extremely difficult  PHQ9 TOTAL SCORE: 12  CASSIE 7 TOTAL SCORE: 9

## 2021-03-09 ASSESSMENT — ANXIETY QUESTIONNAIRES: GAD7 TOTAL SCORE: 9

## 2021-03-09 ASSESSMENT — PATIENT HEALTH QUESTIONNAIRE - PHQ9: SUM OF ALL RESPONSES TO PHQ QUESTIONS 1-9: 12

## 2021-05-09 ENCOUNTER — HEALTH MAINTENANCE LETTER (OUTPATIENT)
Age: 56
End: 2021-05-09

## 2021-05-25 ENCOUNTER — RECORDS - HEALTHEAST (OUTPATIENT)
Dept: ADMINISTRATIVE | Facility: CLINIC | Age: 56
End: 2021-05-25

## 2021-05-31 ENCOUNTER — RECORDS - HEALTHEAST (OUTPATIENT)
Dept: ADMINISTRATIVE | Facility: CLINIC | Age: 56
End: 2021-05-31

## 2021-09-23 ENCOUNTER — TELEPHONE (OUTPATIENT)
Dept: FAMILY MEDICINE | Facility: CLINIC | Age: 56
End: 2021-09-23

## 2021-09-23 DIAGNOSIS — L71.9 ROSACEA: ICD-10-CM

## 2021-09-23 NOTE — TELEPHONE ENCOUNTER
tretinoin (RETIN-A) 0.05 % external cream    Third party reject information  Pharmacy message: product/service not covered for patient age. Maximum patient age of 33    [facility]

## 2021-09-24 RX ORDER — TRETINOIN 0.5 MG/G
CREAM TOPICAL AT BEDTIME
Qty: 45 G | Refills: 3 | Status: SHIPPED | OUTPATIENT
Start: 2021-09-24

## 2021-10-24 ENCOUNTER — HEALTH MAINTENANCE LETTER (OUTPATIENT)
Age: 56
End: 2021-10-24

## 2022-02-13 ENCOUNTER — HEALTH MAINTENANCE LETTER (OUTPATIENT)
Age: 57
End: 2022-02-13

## 2022-06-05 ENCOUNTER — HEALTH MAINTENANCE LETTER (OUTPATIENT)
Age: 57
End: 2022-06-05

## 2022-10-16 ENCOUNTER — HEALTH MAINTENANCE LETTER (OUTPATIENT)
Age: 57
End: 2022-10-16

## 2023-03-26 ENCOUNTER — HEALTH MAINTENANCE LETTER (OUTPATIENT)
Age: 58
End: 2023-03-26

## 2023-06-17 ENCOUNTER — HEALTH MAINTENANCE LETTER (OUTPATIENT)
Age: 58
End: 2023-06-17

## 2024-06-01 ENCOUNTER — HEALTH MAINTENANCE LETTER (OUTPATIENT)
Age: 59
End: 2024-06-01